# Patient Record
Sex: MALE | Race: WHITE | NOT HISPANIC OR LATINO | Employment: FULL TIME | URBAN - METROPOLITAN AREA
[De-identification: names, ages, dates, MRNs, and addresses within clinical notes are randomized per-mention and may not be internally consistent; named-entity substitution may affect disease eponyms.]

---

## 2017-01-11 ENCOUNTER — OFFICE VISIT - HEALTHEAST (OUTPATIENT)
Dept: FAMILY MEDICINE | Facility: CLINIC | Age: 37
End: 2017-01-11

## 2017-01-11 DIAGNOSIS — J03.90 EXUDATIVE TONSILLITIS: ICD-10-CM

## 2017-01-11 ASSESSMENT — MIFFLIN-ST. JEOR: SCORE: 1858.24

## 2017-08-28 ENCOUNTER — COMMUNICATION - HEALTHEAST (OUTPATIENT)
Dept: FAMILY MEDICINE | Facility: CLINIC | Age: 37
End: 2017-08-28

## 2017-09-06 ENCOUNTER — RECORDS - HEALTHEAST (OUTPATIENT)
Dept: ADMINISTRATIVE | Facility: OTHER | Age: 37
End: 2017-09-06

## 2017-09-08 ENCOUNTER — COMMUNICATION - HEALTHEAST (OUTPATIENT)
Dept: FAMILY MEDICINE | Facility: CLINIC | Age: 37
End: 2017-09-08

## 2019-03-11 ENCOUNTER — OFFICE VISIT - HEALTHEAST (OUTPATIENT)
Dept: FAMILY MEDICINE | Facility: CLINIC | Age: 39
End: 2019-03-11

## 2019-03-11 DIAGNOSIS — Z23 NEED FOR VACCINATION: ICD-10-CM

## 2019-03-11 DIAGNOSIS — F41.9 ANXIETY: ICD-10-CM

## 2019-03-11 DIAGNOSIS — L43.9 LICHEN PLANUS: ICD-10-CM

## 2019-03-11 ASSESSMENT — MIFFLIN-ST. JEOR: SCORE: 1933.58

## 2019-03-12 ENCOUNTER — COMMUNICATION - HEALTHEAST (OUTPATIENT)
Dept: FAMILY MEDICINE | Facility: CLINIC | Age: 39
End: 2019-03-12

## 2019-03-12 DIAGNOSIS — L43.9 LICHEN PLANUS: ICD-10-CM

## 2019-04-10 ENCOUNTER — OFFICE VISIT - HEALTHEAST (OUTPATIENT)
Dept: FAMILY MEDICINE | Facility: CLINIC | Age: 39
End: 2019-04-10

## 2019-04-10 DIAGNOSIS — R53.83 FATIGUE, UNSPECIFIED TYPE: ICD-10-CM

## 2019-04-10 DIAGNOSIS — L43.9 LICHEN PLANUS: ICD-10-CM

## 2019-04-10 DIAGNOSIS — F41.9 ANXIETY: ICD-10-CM

## 2019-04-10 ASSESSMENT — MIFFLIN-ST. JEOR: SCORE: 1910.62

## 2019-04-11 LAB — TSH SERPL DL<=0.005 MIU/L-ACNC: 1.57 UIU/ML (ref 0.3–5)

## 2019-04-12 LAB
HAV IGM SERPL QL IA: NEGATIVE
HBV CORE IGM SERPL QL IA: NEGATIVE
HBV SURFACE AG SERPL QL IA: NEGATIVE
HCV AB SERPL QL IA: NEGATIVE

## 2020-08-18 ENCOUNTER — OFFICE VISIT - HEALTHEAST (OUTPATIENT)
Dept: FAMILY MEDICINE | Facility: CLINIC | Age: 40
End: 2020-08-18

## 2020-08-18 DIAGNOSIS — L43.9 LICHEN PLANUS: ICD-10-CM

## 2020-08-18 DIAGNOSIS — F41.9 ANXIETY: ICD-10-CM

## 2020-08-18 RX ORDER — CLOBETASOL PROPIONATE 0.5 MG/G
CREAM TOPICAL
Qty: 45 G | Refills: 1 | Status: SHIPPED | OUTPATIENT
Start: 2020-08-18 | End: 2022-06-11

## 2020-08-18 RX ORDER — SERTRALINE HYDROCHLORIDE 100 MG/1
100 TABLET, FILM COATED ORAL DAILY
Qty: 90 TABLET | Refills: 3 | Status: SHIPPED | OUTPATIENT
Start: 2020-08-18 | End: 2022-06-11

## 2020-08-18 ASSESSMENT — PATIENT HEALTH QUESTIONNAIRE - PHQ9: SUM OF ALL RESPONSES TO PHQ QUESTIONS 1-9: 0

## 2021-05-27 ASSESSMENT — PATIENT HEALTH QUESTIONNAIRE - PHQ9: SUM OF ALL RESPONSES TO PHQ QUESTIONS 1-9: 0

## 2021-05-27 NOTE — PROGRESS NOTES
Assessment / Impression     1. Anxiety  sertraline (ZOLOFT) 100 MG tablet   2. Fatigue, unspecified type  Thyroid Cascade   3. Lichen planus  Hepatitis Acute Evaluation         Plan:     Recommended increasing the sertraline to 100 mg daily.  We are going to check a thyroid cascade as well as an acute hepatitis panel.  He is planning to bring in the lab results that he had done through work which included a hemogram and metabolic panel as well as several others according to his report.  I recommended he follow-up in 3 months or sooner if needed.    Subjective:      HPI: Chad M Johanson is a 38 y.o. male who presents to the clinic for follow-up.  Last month he was started on sertraline 50 mg daily to help with anxiety symptoms.  He reports feeling better on this medication.  His anxiety symptoms proved fairly quickly after starting it.  He has noticed over the past 1-2 weeks that the benefit has plateaued.  He continues to struggle with chronic fatigue symptoms.  This is been an issue over the past year.  He had several labs checked through work recently which she states all of which were normal.  These labs included a hemogram and metabolic panel.  In  he had a normal TSH and his hemoglobin A1c was 5%.  He also has a history of lichen planus and recently establish care with a new dermatologist.  He reports being prescribed topical steroids and they are planning a biopsy.  They also suggested he have hepatitis screening labs checked for this.  I do not have the records yet to review.        Review of Systems  All other systems reviewed and are negative.     Social History     Tobacco Use   Smoking Status Former Smoker     Last attempt to quit: 2013     Years since quittin.2   Smokeless Tobacco Never Used       Family History   Problem Relation Age of Onset     Diabetes Father      Stroke Paternal Uncle      Stroke Paternal Grandfather      Anxiety disorder Mother        Objective:     /72 (Patient  "Site: Right Arm, Patient Position: Sitting, Cuff Size: Adult Large)   Pulse 60   Temp 98.1  F (36.7  C) (Oral)   Resp 16   Ht 6' 2.3\" (1.887 m)   Wt 206 lb 6 oz (93.6 kg)   BMI 26.28 kg/m    Physical Examination: General appearance - alert, well appearing, and in no distress  Eyes: pupils equal and reactive, extraocular eye movements intact  Mouth: mucous membranes moist, pharynx normal without lesions  Neck: supple, no significant adenopathy  Lungs: clear to auscultation, no wheezes, rales or rhonchi, symmetric air entry  Heart: normal rate, regular rhythm, normal S1, S2, no murmurs, rubs, clicks or gallops  Neurological: alert, oriented, normal speech, no focal findings or movement disorder noted.    Extremities: No edema, no clubbing or cyanosis  Psychiatric: Normal affect. Does not appear anxious or depressed.    No results found for this or any previous visit (from the past 168 hour(s)).    Current Outpatient Medications   Medication Sig     [START ON 5/1/2019] sertraline (ZOLOFT) 100 MG tablet Take 1 tablet (100 mg total) by mouth daily.     "

## 2021-05-30 VITALS — HEIGHT: 75 IN | WEIGHT: 194.13 LBS | BODY MASS INDEX: 24.14 KG/M2

## 2021-06-02 VITALS — HEIGHT: 74 IN | BODY MASS INDEX: 26.49 KG/M2 | WEIGHT: 206.38 LBS

## 2021-06-02 VITALS — BODY MASS INDEX: 27.14 KG/M2 | WEIGHT: 211.44 LBS | HEIGHT: 74 IN

## 2021-06-10 NOTE — PROGRESS NOTES
"Chad M Johanson is a 39 y.o. male who is being evaluated via a billable video visit.      The patient has been notified of following:     \"This video visit will be conducted via a call between you and your physician/provider. We have found that certain health care needs can be provided without the need for an in-person physical exam.  This service lets us provide the care you need with a video conversation.  If a prescription is necessary we can send it directly to your pharmacy.  If lab work is needed we can place an order for that and you can then stop by our lab to have the test done at a later time.    Video visits are billed at different rates depending on your insurance coverage. Please reach out to your insurance provider with any questions.    If during the course of the call the physician/provider feels a video visit is not appropriate, you will not be charged for this service.\"    Patient has given verbal consent to a Video visit? Yes  How would you like to obtain your AVS? AVS Preference: MyChart.  If dropped by the video visit, the video invitation should be sent to: Text to cell phone: [phone]  Will anyone else be joining your video visit? No        Video Start Time: 8: 48    Additional provider notes:  Assessment / Impression     1. Anxiety  sertraline (ZOLOFT) 100 MG tablet   2. Lichen planus  clobetasoL (TEMOVATE) 0.05 % cream         Plan:     He was given a refill of sertraline which has worked well for anxiety symptoms in the past.  I recommended he start 50 mg daily for the first 2-3 weeks, then increase to 100 mg daily.  He was also given a prescription for clobetasol which has worked well for him in the past and treating the lichen planus symptoms which primarily affects his shins and hands.  I offered a referral for him to go back and see dermatology, but he declined this for now.    Subjective:      HPI: Chad M Johanson is a 39 y.o. male who is scheduled for a virtual appointment for " follow-up.  He has a history of anxiety and has been on sertraline up to 100 mg daily in the past.  He reports that he has been off his medication for about a year and would like to restart.  He actually reports taking half of his 100 mg tablets recently over the past few days.  He feels like the 100 mg total was better for him than 50 mg daily.  Since being off the medication he reports having increased anxiety symptoms and feeling more on edge.  He also has a history of lichen planus which primarily affects his shins and the backs of his hands.  He has seen dermatology in the past and has been on metronidazole, but he did not feel like this worked as well as clobetasol.  He is wondering if he can get a prescription for the steroid cream today.        Review of Systems  All other systems reviewed and are negative.     Social History     Tobacco Use   Smoking Status Former Smoker     Last attempt to quit: 2013     Years since quittin.6   Smokeless Tobacco Never Used       Family History   Problem Relation Age of Onset     Diabetes Father      Stroke Paternal Uncle      Stroke Paternal Grandfather      Anxiety disorder Mother        Objective:     There were no vitals taken for this visit.  Physical Examination: General appearance - alert, well appearing, and in no distress  Eyes: Eyes appear grossly normal.  Conjunctiva clear.  Extraocular eye movements intact  Mouth: mucous membranes moist  Lungs: Breathing is not labored.  No audible wheezes, rales or rhonchi  Neurological: alert, oriented, normal speech, no focal findings or movement disorder noted.    Psychiatric: Normal affect. Does not appear anxious or depressed.  Insight and judgment intact    No results found for this or any previous visit (from the past 168 hour(s)).    Current Outpatient Medications   Medication Sig     sertraline (ZOLOFT) 100 MG tablet Take 1 tablet (100 mg total) by mouth daily.     clobetasoL (TEMOVATE) 0.05 % cream Apply to  affected area twice daily for 2-week intervals as needed.         Video-Visit Details    Type of service:  Video Visit    Video End Time (time video stopped): 8: 57  Originating Location (pt. Location): Work    Distant Location (provider location):  [facility]     Platform used for Video Visit: Joyce Brice DO

## 2021-06-24 NOTE — PROGRESS NOTES
"Assessment / Impression     1. Anxiety     2. Lichen planus     3. Need for vaccination  Influenza, Seasonal Quad, Preservative Free 36+ Months         Plan:     We discussed treatment options for his worsening anxiety symptoms and decided to start sertraline 50 mg daily.  He is going to take a half dose for the first week.  We discussed strategies to help reduce stress.  I encouraged him to get regular exercise and adequate sleep and to eat a healthy diet.  He may benefit from counseling as well.  He reports having a health dynamics physical through work just a couple weeks ago and all of the blood tests were normal.  He is planning to bring these results to his follow-up appointment in 1 month.  I provided a prescription for topical metronidazole 0.75% that he may apply to the areas of lichen planus twice daily.    Subjective:      HPI: Chad M Johanson is a 38 y.o. male who presents to the clinic to discuss a couple of concerns.  He describes having worsening anxiety symptoms over the past several months.  He feels like he has struggled with anxiety his whole life, but things have been getting worse.  He describes himself as \"wound tight\" and frequently on edge and irritable.  He describes having quite a bit of stress at work which is contributing to this.  He is an .  He is  and has a 3-year-old daughter and 1-year-old son.  His wife is been going back to school which has also been somewhat stressful for the family.  He is interested in starting a medication for this and feels like he should have done this a long time ago.  He denies feeling depressed or suicidal.    He has a history of lichen planus.  He states that this was diagnosed to a dermatologist.  He is to take metronidazole, but he ran out.  He is developing symptoms again on his hands, forearms and legs.        Review of Systems  All other systems reviewed and are negative.     Social History     Tobacco Use   Smoking Status Former " "Smoker     Last attempt to quit: 2013     Years since quittin.1   Smokeless Tobacco Never Used       Family History   Problem Relation Age of Onset     Diabetes Father      Stroke Paternal Uncle      Stroke Paternal Grandfather      Anxiety disorder Mother        Objective:     /58 (Patient Site: Right Arm, Patient Position: Sitting, Cuff Size: Adult Large)   Pulse 64   Temp 97.8  F (36.6  C) (Oral)   Resp 16   Ht 6' 2.3\" (1.887 m)   Wt 211 lb 7 oz (95.9 kg)   BMI 26.93 kg/m    Physical Examination: General appearance - alert, well appearing, and in no distress  Eyes: pupils equal and reactive, extraocular eye movements intact  Mouth: mucous membranes moist, pharynx normal without lesions  Neck: supple, no significant adenopathy  Lungs: clear to auscultation, no wheezes, rales or rhonchi, symmetric air entry  Heart: normal rate, regular rhythm, normal S1, S2, no murmurs, rubs, clicks or gallops  Neurological: alert, oriented, normal speech, no focal findings or movement disorder noted.    Extremities: No edema, no clubbing or cyanosis  Psychiatric: Appears moderately anxious.  Answers questions appropriately.  Does not appear depressed.  PHQ-9 score is 8.  OTIS-7 score is 18.  Skin: There are several erythematous, circular patches that are slightly raised on his forearms, hands and shins.    No results found for this or any previous visit (from the past 168 hour(s)).    Current Outpatient Medications   Medication Sig     metroNIDAZOLE (METROCREAM) 0.75 % cream Apply to affected area twice daily.     sertraline (ZOLOFT) 50 MG tablet Take 1 tablet (50 mg total) by mouth daily.     "

## 2021-08-21 ENCOUNTER — HEALTH MAINTENANCE LETTER (OUTPATIENT)
Age: 41
End: 2021-08-21

## 2021-10-16 ENCOUNTER — HEALTH MAINTENANCE LETTER (OUTPATIENT)
Age: 41
End: 2021-10-16

## 2022-01-03 ENCOUNTER — TRANSFERRED RECORDS (OUTPATIENT)
Dept: HEALTH INFORMATION MANAGEMENT | Facility: CLINIC | Age: 42
End: 2022-01-03
Payer: COMMERCIAL

## 2022-06-11 ENCOUNTER — HOSPITAL ENCOUNTER (INPATIENT)
Facility: HOSPITAL | Age: 42
LOS: 2 days | Discharge: HOME | DRG: 343 | End: 2022-06-14
Attending: EMERGENCY MEDICINE | Admitting: SPECIALIST
Payer: COMMERCIAL

## 2022-06-11 ENCOUNTER — APPOINTMENT (OUTPATIENT)
Dept: CT IMAGING | Facility: HOSPITAL | Age: 42
DRG: 343 | End: 2022-06-11
Attending: EMERGENCY MEDICINE
Payer: COMMERCIAL

## 2022-06-11 DIAGNOSIS — K35.30 ACUTE APPENDICITIS WITH LOCALIZED PERITONITIS, WITHOUT PERFORATION, ABSCESS, OR GANGRENE: ICD-10-CM

## 2022-06-11 LAB
ALBUMIN SERPL-MCNC: 4.2 G/DL (ref 3.5–5)
ALP SERPL-CCNC: 98 U/L (ref 45–120)
ALT SERPL W P-5'-P-CCNC: 24 U/L (ref 0–45)
ANION GAP SERPL CALCULATED.3IONS-SCNC: 11 MMOL/L (ref 5–18)
AST SERPL W P-5'-P-CCNC: 30 U/L (ref 0–40)
BASOPHILS # BLD AUTO: 0.1 10E3/UL (ref 0–0.2)
BASOPHILS NFR BLD AUTO: 0 %
BILIRUB DIRECT SERPL-MCNC: 0.2 MG/DL
BILIRUB SERPL-MCNC: 0.8 MG/DL (ref 0–1)
BUN SERPL-MCNC: 19 MG/DL (ref 8–22)
CALCIUM SERPL-MCNC: 9.3 MG/DL (ref 8.5–10.5)
CHLORIDE BLD-SCNC: 106 MMOL/L (ref 98–107)
CO2 SERPL-SCNC: 21 MMOL/L (ref 22–31)
CREAT SERPL-MCNC: 0.85 MG/DL (ref 0.7–1.3)
EOSINOPHIL # BLD AUTO: 0 10E3/UL (ref 0–0.7)
EOSINOPHIL NFR BLD AUTO: 0 %
ERYTHROCYTE [DISTWIDTH] IN BLOOD BY AUTOMATED COUNT: 13.3 % (ref 10–15)
GFR SERPL CREATININE-BSD FRML MDRD: >90 ML/MIN/1.73M2
GLUCOSE BLD-MCNC: 121 MG/DL (ref 70–125)
HCT VFR BLD AUTO: 42.9 % (ref 40–53)
HGB BLD-MCNC: 15.2 G/DL (ref 13.3–17.7)
IMM GRANULOCYTES # BLD: 0.1 10E3/UL
IMM GRANULOCYTES NFR BLD: 0 %
LYMPHOCYTES # BLD AUTO: 1.6 10E3/UL (ref 0.8–5.3)
LYMPHOCYTES NFR BLD AUTO: 10 %
MCH RBC QN AUTO: 30.7 PG (ref 26.5–33)
MCHC RBC AUTO-ENTMCNC: 35.4 G/DL (ref 31.5–36.5)
MCV RBC AUTO: 87 FL (ref 78–100)
MONOCYTES # BLD AUTO: 0.7 10E3/UL (ref 0–1.3)
MONOCYTES NFR BLD AUTO: 5 %
NEUTROPHILS # BLD AUTO: 13.4 10E3/UL (ref 1.6–8.3)
NEUTROPHILS NFR BLD AUTO: 85 %
NRBC # BLD AUTO: 0 10E3/UL
NRBC BLD AUTO-RTO: 0 /100
PLATELET # BLD AUTO: 234 10E3/UL (ref 150–450)
POTASSIUM BLD-SCNC: 4.1 MMOL/L (ref 3.5–5)
PROT SERPL-MCNC: 7.2 G/DL (ref 6–8)
RBC # BLD AUTO: 4.95 10E6/UL (ref 4.4–5.9)
SARS-COV-2 RNA RESP QL NAA+PROBE: NEGATIVE
SODIUM SERPL-SCNC: 138 MMOL/L (ref 136–145)
WBC # BLD AUTO: 15.7 10E3/UL (ref 4–11)

## 2022-06-11 PROCEDURE — G0378 HOSPITAL OBSERVATION PER HR: HCPCS

## 2022-06-11 PROCEDURE — 36415 COLL VENOUS BLD VENIPUNCTURE: CPT | Performed by: STUDENT IN AN ORGANIZED HEALTH CARE EDUCATION/TRAINING PROGRAM

## 2022-06-11 PROCEDURE — 82248 BILIRUBIN DIRECT: CPT | Performed by: STUDENT IN AN ORGANIZED HEALTH CARE EDUCATION/TRAINING PROGRAM

## 2022-06-11 PROCEDURE — 87635 SARS-COV-2 COVID-19 AMP PRB: CPT | Performed by: EMERGENCY MEDICINE

## 2022-06-11 PROCEDURE — 99285 EMERGENCY DEPT VISIT HI MDM: CPT | Mod: 25

## 2022-06-11 PROCEDURE — 85004 AUTOMATED DIFF WBC COUNT: CPT | Performed by: STUDENT IN AN ORGANIZED HEALTH CARE EDUCATION/TRAINING PROGRAM

## 2022-06-11 PROCEDURE — C9803 HOPD COVID-19 SPEC COLLECT: HCPCS

## 2022-06-11 PROCEDURE — 96375 TX/PRO/DX INJ NEW DRUG ADDON: CPT

## 2022-06-11 PROCEDURE — 82248 BILIRUBIN DIRECT: CPT | Performed by: EMERGENCY MEDICINE

## 2022-06-11 PROCEDURE — 85025 COMPLETE CBC W/AUTO DIFF WBC: CPT | Performed by: EMERGENCY MEDICINE

## 2022-06-11 PROCEDURE — 74177 CT ABD & PELVIS W/CONTRAST: CPT

## 2022-06-11 PROCEDURE — 250N000011 HC RX IP 250 OP 636: Performed by: EMERGENCY MEDICINE

## 2022-06-11 PROCEDURE — 96374 THER/PROPH/DIAG INJ IV PUSH: CPT | Mod: 59

## 2022-06-11 RX ORDER — PIPERACILLIN SODIUM, TAZOBACTAM SODIUM 3; .375 G/15ML; G/15ML
3.38 INJECTION, POWDER, LYOPHILIZED, FOR SOLUTION INTRAVENOUS ONCE
Status: COMPLETED | OUTPATIENT
Start: 2022-06-11 | End: 2022-06-11

## 2022-06-11 RX ORDER — IOPAMIDOL 755 MG/ML
100 INJECTION, SOLUTION INTRAVASCULAR ONCE
Status: COMPLETED | OUTPATIENT
Start: 2022-06-11 | End: 2022-06-11

## 2022-06-11 RX ORDER — MORPHINE SULFATE 4 MG/ML
4 INJECTION, SOLUTION INTRAMUSCULAR; INTRAVENOUS ONCE
Status: COMPLETED | OUTPATIENT
Start: 2022-06-11 | End: 2022-06-11

## 2022-06-11 RX ADMIN — MORPHINE SULFATE 4 MG: 4 INJECTION INTRAVENOUS at 22:29

## 2022-06-11 RX ADMIN — PIPERACILLIN AND TAZOBACTAM 3.38 G: 3; .375 INJECTION, POWDER, FOR SOLUTION INTRAVENOUS at 22:30

## 2022-06-11 RX ADMIN — IOPAMIDOL 100 ML: 755 INJECTION, SOLUTION INTRAVENOUS at 21:03

## 2022-06-11 NOTE — ED TRIAGE NOTES
patient here for right sided abdominal pain, patient reports this began this morning. Patient reports that he is nauseated. Patient states he has his appendix. Patient states no medical problems and that pain is worsening since this morning.

## 2022-06-12 ENCOUNTER — ANESTHESIA EVENT (OUTPATIENT)
Dept: SURGERY | Facility: HOSPITAL | Age: 42
DRG: 343 | End: 2022-06-12
Payer: COMMERCIAL

## 2022-06-12 ENCOUNTER — ANESTHESIA (OUTPATIENT)
Dept: SURGERY | Facility: HOSPITAL | Age: 42
DRG: 343 | End: 2022-06-12
Payer: COMMERCIAL

## 2022-06-12 LAB — PLATELET # BLD AUTO: 222 10E3/UL (ref 150–450)

## 2022-06-12 PROCEDURE — 120N000001 HC R&B MED SURG/OB

## 2022-06-12 PROCEDURE — 360N000076 HC SURGERY LEVEL 3, PER MIN: Performed by: SPECIALIST

## 2022-06-12 PROCEDURE — 250N000011 HC RX IP 250 OP 636: Performed by: EMERGENCY MEDICINE

## 2022-06-12 PROCEDURE — 258N000003 HC RX IP 258 OP 636: Performed by: ANESTHESIOLOGY

## 2022-06-12 PROCEDURE — 44970 LAPAROSCOPY APPENDECTOMY: CPT | Performed by: SPECIALIST

## 2022-06-12 PROCEDURE — 710N000009 HC RECOVERY PHASE 1, LEVEL 1, PER MIN: Performed by: SPECIALIST

## 2022-06-12 PROCEDURE — 250N000011 HC RX IP 250 OP 636: Performed by: ANESTHESIOLOGY

## 2022-06-12 PROCEDURE — 250N000009 HC RX 250: Performed by: NURSE ANESTHETIST, CERTIFIED REGISTERED

## 2022-06-12 PROCEDURE — 258N000003 HC RX IP 258 OP 636: Performed by: EMERGENCY MEDICINE

## 2022-06-12 PROCEDURE — G0378 HOSPITAL OBSERVATION PER HR: HCPCS

## 2022-06-12 PROCEDURE — 250N000013 HC RX MED GY IP 250 OP 250 PS 637: Performed by: SPECIALIST

## 2022-06-12 PROCEDURE — 96376 TX/PRO/DX INJ SAME DRUG ADON: CPT

## 2022-06-12 PROCEDURE — 88304 TISSUE EXAM BY PATHOLOGIST: CPT | Mod: TC | Performed by: SPECIALIST

## 2022-06-12 PROCEDURE — 272N000001 HC OR GENERAL SUPPLY STERILE: Performed by: SPECIALIST

## 2022-06-12 PROCEDURE — 85049 AUTOMATED PLATELET COUNT: CPT | Performed by: SPECIALIST

## 2022-06-12 PROCEDURE — 999N000141 HC STATISTIC PRE-PROCEDURE NURSING ASSESSMENT: Performed by: SPECIALIST

## 2022-06-12 PROCEDURE — 0DTJ4ZZ RESECTION OF APPENDIX, PERCUTANEOUS ENDOSCOPIC APPROACH: ICD-10-PCS | Performed by: SPECIALIST

## 2022-06-12 PROCEDURE — 36415 COLL VENOUS BLD VENIPUNCTURE: CPT | Performed by: SPECIALIST

## 2022-06-12 PROCEDURE — 250N000011 HC RX IP 250 OP 636: Performed by: SPECIALIST

## 2022-06-12 PROCEDURE — 99222 1ST HOSP IP/OBS MODERATE 55: CPT | Mod: 57 | Performed by: SPECIALIST

## 2022-06-12 PROCEDURE — 370N000017 HC ANESTHESIA TECHNICAL FEE, PER MIN: Performed by: SPECIALIST

## 2022-06-12 PROCEDURE — 250N000025 HC SEVOFLURANE, PER MIN: Performed by: SPECIALIST

## 2022-06-12 PROCEDURE — 250N000011 HC RX IP 250 OP 636: Performed by: NURSE ANESTHETIST, CERTIFIED REGISTERED

## 2022-06-12 RX ORDER — ONDANSETRON 4 MG/1
4 TABLET, ORALLY DISINTEGRATING ORAL EVERY 30 MIN PRN
Status: DISCONTINUED | OUTPATIENT
Start: 2022-06-12 | End: 2022-06-12 | Stop reason: HOSPADM

## 2022-06-12 RX ORDER — LIDOCAINE 40 MG/G
CREAM TOPICAL
Status: DISCONTINUED | OUTPATIENT
Start: 2022-06-12 | End: 2022-06-14 | Stop reason: HOSPADM

## 2022-06-12 RX ORDER — LIDOCAINE 40 MG/G
CREAM TOPICAL
Status: DISCONTINUED | OUTPATIENT
Start: 2022-06-12 | End: 2022-06-12 | Stop reason: HOSPADM

## 2022-06-12 RX ORDER — SODIUM CHLORIDE, SODIUM LACTATE, POTASSIUM CHLORIDE, CALCIUM CHLORIDE 600; 310; 30; 20 MG/100ML; MG/100ML; MG/100ML; MG/100ML
INJECTION, SOLUTION INTRAVENOUS CONTINUOUS
Status: DISCONTINUED | OUTPATIENT
Start: 2022-06-12 | End: 2022-06-12 | Stop reason: HOSPADM

## 2022-06-12 RX ORDER — PROCHLORPERAZINE MALEATE 10 MG
10 TABLET ORAL EVERY 6 HOURS PRN
Status: DISCONTINUED | OUTPATIENT
Start: 2022-06-12 | End: 2022-06-14 | Stop reason: HOSPADM

## 2022-06-12 RX ORDER — OXYCODONE HYDROCHLORIDE 5 MG/1
5 TABLET ORAL EVERY 4 HOURS PRN
Status: DISCONTINUED | OUTPATIENT
Start: 2022-06-12 | End: 2022-06-14 | Stop reason: HOSPADM

## 2022-06-12 RX ORDER — OXYCODONE HYDROCHLORIDE 5 MG/1
5-10 TABLET ORAL EVERY 4 HOURS PRN
Status: DISCONTINUED | OUTPATIENT
Start: 2022-06-12 | End: 2022-06-12 | Stop reason: HOSPADM

## 2022-06-12 RX ORDER — NALOXONE HYDROCHLORIDE 0.4 MG/ML
0.2 INJECTION, SOLUTION INTRAMUSCULAR; INTRAVENOUS; SUBCUTANEOUS
Status: DISCONTINUED | OUTPATIENT
Start: 2022-06-12 | End: 2022-06-14 | Stop reason: HOSPADM

## 2022-06-12 RX ORDER — NALOXONE HYDROCHLORIDE 0.4 MG/ML
0.4 INJECTION, SOLUTION INTRAMUSCULAR; INTRAVENOUS; SUBCUTANEOUS
Status: DISCONTINUED | OUTPATIENT
Start: 2022-06-12 | End: 2022-06-14 | Stop reason: HOSPADM

## 2022-06-12 RX ORDER — MORPHINE SULFATE 4 MG/ML
4 INJECTION, SOLUTION INTRAMUSCULAR; INTRAVENOUS ONCE
Status: COMPLETED | OUTPATIENT
Start: 2022-06-12 | End: 2022-06-12

## 2022-06-12 RX ORDER — FENTANYL CITRATE 50 UG/ML
50 INJECTION, SOLUTION INTRAMUSCULAR; INTRAVENOUS
Status: DISCONTINUED | OUTPATIENT
Start: 2022-06-12 | End: 2022-06-12 | Stop reason: HOSPADM

## 2022-06-12 RX ORDER — FENTANYL CITRATE 50 UG/ML
INJECTION, SOLUTION INTRAMUSCULAR; INTRAVENOUS PRN
Status: DISCONTINUED | OUTPATIENT
Start: 2022-06-12 | End: 2022-06-12

## 2022-06-12 RX ORDER — POLYETHYLENE GLYCOL 3350 17 G/17G
17 POWDER, FOR SOLUTION ORAL DAILY
Status: DISCONTINUED | OUTPATIENT
Start: 2022-06-13 | End: 2022-06-14 | Stop reason: HOSPADM

## 2022-06-12 RX ORDER — MEPERIDINE HYDROCHLORIDE 25 MG/ML
12.5 INJECTION INTRAMUSCULAR; INTRAVENOUS; SUBCUTANEOUS
Status: DISCONTINUED | OUTPATIENT
Start: 2022-06-12 | End: 2022-06-12 | Stop reason: HOSPADM

## 2022-06-12 RX ORDER — BISACODYL 10 MG
10 SUPPOSITORY, RECTAL RECTAL DAILY PRN
Status: DISCONTINUED | OUTPATIENT
Start: 2022-06-12 | End: 2022-06-14 | Stop reason: HOSPADM

## 2022-06-12 RX ORDER — ONDANSETRON 2 MG/ML
4 INJECTION INTRAMUSCULAR; INTRAVENOUS EVERY 6 HOURS PRN
Status: DISCONTINUED | OUTPATIENT
Start: 2022-06-12 | End: 2022-06-14 | Stop reason: HOSPADM

## 2022-06-12 RX ORDER — CALCIUM CARBONATE 500 MG/1
500 TABLET, CHEWABLE ORAL 4 TIMES DAILY PRN
Status: DISCONTINUED | OUTPATIENT
Start: 2022-06-12 | End: 2022-06-14 | Stop reason: HOSPADM

## 2022-06-12 RX ORDER — HEPARIN SODIUM 5000 [USP'U]/.5ML
5000 INJECTION, SOLUTION INTRAVENOUS; SUBCUTANEOUS EVERY 8 HOURS
Status: DISCONTINUED | OUTPATIENT
Start: 2022-06-13 | End: 2022-06-14 | Stop reason: HOSPADM

## 2022-06-12 RX ORDER — LIDOCAINE HYDROCHLORIDE 10 MG/ML
INJECTION, SOLUTION INFILTRATION; PERINEURAL PRN
Status: DISCONTINUED | OUTPATIENT
Start: 2022-06-12 | End: 2022-06-12

## 2022-06-12 RX ORDER — AMOXICILLIN 250 MG
1 CAPSULE ORAL 2 TIMES DAILY
Status: DISCONTINUED | OUTPATIENT
Start: 2022-06-12 | End: 2022-06-14 | Stop reason: HOSPADM

## 2022-06-12 RX ORDER — ACETAMINOPHEN 325 MG/1
975 TABLET ORAL EVERY 8 HOURS
Status: DISCONTINUED | OUTPATIENT
Start: 2022-06-12 | End: 2022-06-14 | Stop reason: HOSPADM

## 2022-06-12 RX ORDER — SODIUM CHLORIDE, SODIUM LACTATE, POTASSIUM CHLORIDE, CALCIUM CHLORIDE 600; 310; 30; 20 MG/100ML; MG/100ML; MG/100ML; MG/100ML
INJECTION, SOLUTION INTRAVENOUS CONTINUOUS
Status: DISCONTINUED | OUTPATIENT
Start: 2022-06-12 | End: 2022-06-13 | Stop reason: CLARIF

## 2022-06-12 RX ORDER — LABETALOL HYDROCHLORIDE 5 MG/ML
10 INJECTION, SOLUTION INTRAVENOUS
Status: DISCONTINUED | OUTPATIENT
Start: 2022-06-12 | End: 2022-06-12 | Stop reason: HOSPADM

## 2022-06-12 RX ORDER — HYDROMORPHONE HCL IN WATER/PF 6 MG/30 ML
0.2 PATIENT CONTROLLED ANALGESIA SYRINGE INTRAVENOUS
Status: DISCONTINUED | OUTPATIENT
Start: 2022-06-12 | End: 2022-06-14 | Stop reason: HOSPADM

## 2022-06-12 RX ORDER — OXYCODONE HYDROCHLORIDE 5 MG/1
10 TABLET ORAL EVERY 4 HOURS PRN
Status: DISCONTINUED | OUTPATIENT
Start: 2022-06-12 | End: 2022-06-14 | Stop reason: HOSPADM

## 2022-06-12 RX ORDER — ONDANSETRON 4 MG/1
4 TABLET, ORALLY DISINTEGRATING ORAL EVERY 6 HOURS PRN
Status: DISCONTINUED | OUTPATIENT
Start: 2022-06-12 | End: 2022-06-14 | Stop reason: HOSPADM

## 2022-06-12 RX ORDER — GLYCOPYRROLATE 0.2 MG/ML
INJECTION, SOLUTION INTRAMUSCULAR; INTRAVENOUS PRN
Status: DISCONTINUED | OUTPATIENT
Start: 2022-06-12 | End: 2022-06-12

## 2022-06-12 RX ORDER — PROPOFOL 10 MG/ML
INJECTION, EMULSION INTRAVENOUS PRN
Status: DISCONTINUED | OUTPATIENT
Start: 2022-06-12 | End: 2022-06-12

## 2022-06-12 RX ORDER — BUPIVACAINE HYDROCHLORIDE 2.5 MG/ML
INJECTION, SOLUTION INFILTRATION; PERINEURAL PRN
Status: DISCONTINUED | OUTPATIENT
Start: 2022-06-12 | End: 2022-06-12 | Stop reason: HOSPADM

## 2022-06-12 RX ORDER — DEXAMETHASONE SODIUM PHOSPHATE 10 MG/ML
INJECTION, SOLUTION INTRAMUSCULAR; INTRAVENOUS PRN
Status: DISCONTINUED | OUTPATIENT
Start: 2022-06-12 | End: 2022-06-12

## 2022-06-12 RX ORDER — PIPERACILLIN SODIUM, TAZOBACTAM SODIUM 3; .375 G/15ML; G/15ML
3.38 INJECTION, POWDER, LYOPHILIZED, FOR SOLUTION INTRAVENOUS EVERY 8 HOURS
Status: DISCONTINUED | OUTPATIENT
Start: 2022-06-12 | End: 2022-06-14 | Stop reason: HOSPADM

## 2022-06-12 RX ORDER — ONDANSETRON 2 MG/ML
4 INJECTION INTRAMUSCULAR; INTRAVENOUS EVERY 30 MIN PRN
Status: DISCONTINUED | OUTPATIENT
Start: 2022-06-12 | End: 2022-06-12 | Stop reason: HOSPADM

## 2022-06-12 RX ORDER — SODIUM CHLORIDE 9 MG/ML
INJECTION, SOLUTION INTRAVENOUS ONCE
Status: DISCONTINUED | OUTPATIENT
Start: 2022-06-12 | End: 2022-06-14 | Stop reason: HOSPADM

## 2022-06-12 RX ORDER — DIPHENHYDRAMINE HCL 25 MG
25 CAPSULE ORAL EVERY 6 HOURS PRN
Status: DISCONTINUED | OUTPATIENT
Start: 2022-06-12 | End: 2022-06-14 | Stop reason: HOSPADM

## 2022-06-12 RX ORDER — PIPERACILLIN SODIUM, TAZOBACTAM SODIUM 3; .375 G/15ML; G/15ML
3.38 INJECTION, POWDER, LYOPHILIZED, FOR SOLUTION INTRAVENOUS EVERY 8 HOURS
Status: DISCONTINUED | OUTPATIENT
Start: 2022-06-12 | End: 2022-06-12

## 2022-06-12 RX ORDER — ACETAMINOPHEN 325 MG/1
650 TABLET ORAL EVERY 4 HOURS PRN
Status: DISCONTINUED | OUTPATIENT
Start: 2022-06-15 | End: 2022-06-14 | Stop reason: HOSPADM

## 2022-06-12 RX ORDER — HYDROMORPHONE HCL IN WATER/PF 6 MG/30 ML
0.4 PATIENT CONTROLLED ANALGESIA SYRINGE INTRAVENOUS
Status: DISCONTINUED | OUTPATIENT
Start: 2022-06-12 | End: 2022-06-14 | Stop reason: HOSPADM

## 2022-06-12 RX ORDER — FENTANYL CITRATE 50 UG/ML
50 INJECTION, SOLUTION INTRAMUSCULAR; INTRAVENOUS EVERY 5 MIN PRN
Status: DISCONTINUED | OUTPATIENT
Start: 2022-06-12 | End: 2022-06-12 | Stop reason: HOSPADM

## 2022-06-12 RX ORDER — DIPHENHYDRAMINE HYDROCHLORIDE 50 MG/ML
25 INJECTION INTRAMUSCULAR; INTRAVENOUS EVERY 6 HOURS PRN
Status: DISCONTINUED | OUTPATIENT
Start: 2022-06-12 | End: 2022-06-14 | Stop reason: HOSPADM

## 2022-06-12 RX ORDER — KETOROLAC TROMETHAMINE 15 MG/ML
15 INJECTION, SOLUTION INTRAMUSCULAR; INTRAVENOUS EVERY 6 HOURS
Status: COMPLETED | OUTPATIENT
Start: 2022-06-12 | End: 2022-06-13

## 2022-06-12 RX ORDER — ONDANSETRON 2 MG/ML
INJECTION INTRAMUSCULAR; INTRAVENOUS PRN
Status: DISCONTINUED | OUTPATIENT
Start: 2022-06-12 | End: 2022-06-12

## 2022-06-12 RX ADMIN — PIPERACILLIN AND TAZOBACTAM 3.38 G: 3; .375 INJECTION, POWDER, LYOPHILIZED, FOR SOLUTION INTRAVENOUS at 17:42

## 2022-06-12 RX ADMIN — KETOROLAC TROMETHAMINE 15 MG: 15 INJECTION, SOLUTION INTRAMUSCULAR; INTRAVENOUS at 19:34

## 2022-06-12 RX ADMIN — PIPERACILLIN AND TAZOBACTAM 3.38 G: 3; .375 INJECTION, POWDER, LYOPHILIZED, FOR SOLUTION INTRAVENOUS at 09:26

## 2022-06-12 RX ADMIN — OXYCODONE HYDROCHLORIDE 10 MG: 5 TABLET ORAL at 14:49

## 2022-06-12 RX ADMIN — ROCURONIUM BROMIDE 20 MG: 50 INJECTION, SOLUTION INTRAVENOUS at 11:09

## 2022-06-12 RX ADMIN — MIDAZOLAM 2 MG: 1 INJECTION INTRAMUSCULAR; INTRAVENOUS at 10:39

## 2022-06-12 RX ADMIN — MORPHINE SULFATE 4 MG: 4 INJECTION, SOLUTION INTRAMUSCULAR; INTRAVENOUS at 04:27

## 2022-06-12 RX ADMIN — KETOROLAC TROMETHAMINE 15 MG: 15 INJECTION, SOLUTION INTRAMUSCULAR; INTRAVENOUS at 13:53

## 2022-06-12 RX ADMIN — FENTANYL CITRATE 50 MCG: 50 INJECTION INTRAMUSCULAR; INTRAVENOUS at 12:03

## 2022-06-12 RX ADMIN — SUGAMMADEX 100 MG: 100 INJECTION, SOLUTION INTRAVENOUS at 11:49

## 2022-06-12 RX ADMIN — SODIUM CHLORIDE, POTASSIUM CHLORIDE, SODIUM LACTATE AND CALCIUM CHLORIDE: 600; 310; 30; 20 INJECTION, SOLUTION INTRAVENOUS at 10:39

## 2022-06-12 RX ADMIN — FENTANYL CITRATE 50 MCG: 50 INJECTION, SOLUTION INTRAMUSCULAR; INTRAVENOUS at 11:48

## 2022-06-12 RX ADMIN — PROPOFOL 200 MG: 10 INJECTION, EMULSION INTRAVENOUS at 10:43

## 2022-06-12 RX ADMIN — SUGAMMADEX 200 MG: 100 INJECTION, SOLUTION INTRAVENOUS at 11:42

## 2022-06-12 RX ADMIN — OXYCODONE HYDROCHLORIDE 10 MG: 5 TABLET ORAL at 23:17

## 2022-06-12 RX ADMIN — SODIUM CHLORIDE 1000 ML: 9 INJECTION, SOLUTION INTRAVENOUS at 08:33

## 2022-06-12 RX ADMIN — FENTANYL CITRATE 100 MCG: 50 INJECTION, SOLUTION INTRAMUSCULAR; INTRAVENOUS at 10:42

## 2022-06-12 RX ADMIN — ACETAMINOPHEN 975 MG: 325 TABLET ORAL at 20:42

## 2022-06-12 RX ADMIN — HYDROMORPHONE HYDROCHLORIDE 0.4 MG: 0.2 INJECTION, SOLUTION INTRAMUSCULAR; INTRAVENOUS; SUBCUTANEOUS at 20:41

## 2022-06-12 RX ADMIN — ROCURONIUM BROMIDE 40 MG: 50 INJECTION, SOLUTION INTRAVENOUS at 10:43

## 2022-06-12 RX ADMIN — ROCURONIUM BROMIDE 10 MG: 50 INJECTION, SOLUTION INTRAVENOUS at 10:58

## 2022-06-12 RX ADMIN — HYDROMORPHONE HYDROCHLORIDE 1 MG: 1 INJECTION, SOLUTION INTRAMUSCULAR; INTRAVENOUS; SUBCUTANEOUS at 11:10

## 2022-06-12 RX ADMIN — DEXAMETHASONE SODIUM PHOSPHATE 10 MG: 10 INJECTION, SOLUTION INTRAMUSCULAR; INTRAVENOUS at 10:42

## 2022-06-12 RX ADMIN — HYDROMORPHONE HYDROCHLORIDE 0.2 MG: 0.2 INJECTION, SOLUTION INTRAMUSCULAR; INTRAVENOUS; SUBCUTANEOUS at 16:57

## 2022-06-12 RX ADMIN — ACETAMINOPHEN 975 MG: 325 TABLET ORAL at 13:53

## 2022-06-12 RX ADMIN — FENTANYL CITRATE 50 MCG: 50 INJECTION INTRAMUSCULAR; INTRAVENOUS at 12:27

## 2022-06-12 RX ADMIN — MORPHINE SULFATE 4 MG: 4 INJECTION INTRAVENOUS at 08:32

## 2022-06-12 RX ADMIN — FENTANYL CITRATE 50 MCG: 50 INJECTION INTRAMUSCULAR; INTRAVENOUS at 12:38

## 2022-06-12 RX ADMIN — LIDOCAINE HYDROCHLORIDE 50 MG: 10 INJECTION, SOLUTION INFILTRATION; PERINEURAL at 10:42

## 2022-06-12 RX ADMIN — HYDROMORPHONE HYDROCHLORIDE 0.4 MG: 0.2 INJECTION, SOLUTION INTRAMUSCULAR; INTRAVENOUS; SUBCUTANEOUS at 23:36

## 2022-06-12 RX ADMIN — FENTANYL CITRATE 50 MCG: 50 INJECTION INTRAMUSCULAR; INTRAVENOUS at 12:14

## 2022-06-12 RX ADMIN — GLYCOPYRROLATE 0.2 MG: 0.2 INJECTION, SOLUTION INTRAMUSCULAR; INTRAVENOUS at 10:42

## 2022-06-12 RX ADMIN — ONDANSETRON 4 MG: 2 INJECTION INTRAMUSCULAR; INTRAVENOUS at 11:36

## 2022-06-12 RX ADMIN — SENNOSIDES AND DOCUSATE SODIUM 1 TABLET: 8.6; 5 TABLET ORAL at 20:42

## 2022-06-12 ASSESSMENT — ACTIVITIES OF DAILY LIVING (ADL)
ADLS_ACUITY_SCORE: 18

## 2022-06-12 ASSESSMENT — LIFESTYLE VARIABLES: TOBACCO_USE: 1

## 2022-06-12 NOTE — ANESTHESIA POSTPROCEDURE EVALUATION
Patient: Chad M Johanson    Procedure: Procedure(s):  APPENDECTOMY, LAPAROSCOPIC       Anesthesia Type:  General    Note:  Disposition: Inpatient   Postop Pain Control: Uneventful            Sign Out: Well controlled pain   PONV: No   Neuro/Psych: Uneventful            Sign Out: Acceptable/Baseline neuro status   Airway/Respiratory: Uneventful            Sign Out: Acceptable/Baseline resp. status   CV/Hemodynamics: Uneventful            Sign Out: Acceptable CV status; No obvious hypovolemia; No obvious fluid overload   Other NRE:    DID A NON-ROUTINE EVENT OCCUR?            Last vitals:  Vitals Value Taken Time   /72 06/12/22 1250   Temp 37.2  C (98.9  F) 06/12/22 1250   Pulse 86 06/12/22 1300   Resp 14 06/12/22 1300   SpO2 99 % 06/12/22 1300   Vitals shown include unvalidated device data.    Electronically Signed By: Angela Livingston MD  June 12, 2022  1:50 PM

## 2022-06-12 NOTE — PHARMACY-ADMISSION MEDICATION HISTORY
Pharmacy Note - Admission Medication History    Pertinent Provider Information: None     ______________________________________________________________________    Prior To Admission (PTA) med list completed and updated in EMR.       No outpatient medications have been marked as taking for the 6/11/22 encounter (Hospital Encounter).       Information source(s): Patient and CareEverywhere/SureScripts  Method of interview communication: in-person    Summary of Changes to PTA Med List  New: None  Discontinued: Sertraline, clobetasol cream  Changed: None    Patient was asked about OTC/herbal products specifically.  PTA med list reflects this.    Allergies were reviewed, assessed, and updated with the patient.      Patient does not use any multi-dose medications prior to admission.    The information provided in this note is only as accurate as the sources available at the time of the update(s).    Thank you for the opportunity to participate in the care of this patient.    Stefanie Dumont [facility]  6/11/2022 10:03 PM

## 2022-06-12 NOTE — ANESTHESIA PREPROCEDURE EVALUATION
Anesthesia Pre-Procedure Evaluation    Patient: Chad M Johanson   MRN: [id number] : 1980        Procedure : Procedure(s):  APPENDECTOMY, LAPAROSCOPIC          History reviewed. No pertinent past medical history.   History reviewed. No pertinent surgical history.   No Known Allergies   Social History     Tobacco Use     Smoking status: Former Smoker     Quit date: 2013     Years since quittin.4     Smokeless tobacco: Never Used   Substance Use Topics     Alcohol use: No      Wt Readings from Last 1 Encounters:   22 99.8 kg (220 lb)        Anesthesia Evaluation   Pt has had prior anesthetic. Type: General.    No history of anesthetic complications       ROS/MED HX  ENT/Pulmonary:     (+) tobacco use,     Neurologic:  - neg neurologic ROS     Cardiovascular:  - neg cardiovascular ROS     METS/Exercise Tolerance:     Hematologic:  - neg hematologic  ROS     Musculoskeletal:       GI/Hepatic:  - neg GI/hepatic ROS     Renal/Genitourinary:  - neg Renal ROS     Endo:  - neg endo ROS     Psychiatric/Substance Use:     (+) psychiatric history depression     Infectious Disease:       Malignancy:       Other:            Physical Exam    Airway        Mallampati: II   TM distance: > 3 FB   Neck ROM: full     Respiratory Devices and Support         Dental  no notable dental history         Cardiovascular          Rhythm and rate: regular and normal     Pulmonary           breath sounds clear to auscultation           OUTSIDE LABS:  CBC:   Lab Results   Component Value Date    WBC 15.7 (H) 2022    HGB 15.2 2022    HCT 42.9 2022     2022     BMP:   Lab Results   Component Value Date     2022    POTASSIUM 4.1 2022    CHLORIDE 106 2022    CO2 21 (L) 2022    BUN 19 2022    CR 0.85 2022     2022     COAGS: No results found for: PTT, INR, FIBR  POC: No results found for: BGM, HCG, HCGS  HEPATIC:   Lab Results   Component Value  Date    ALBUMIN 4.2 06/11/2022    PROTTOTAL 7.2 06/11/2022    ALT 24 06/11/2022    AST 30 06/11/2022    ALKPHOS 98 06/11/2022    BILITOTAL 0.8 06/11/2022     OTHER:   Lab Results   Component Value Date    A1C 5.0 06/06/2013    RAUL 9.3 06/11/2022    TSH 1.57 04/10/2019       Anesthesia Plan    ASA Status:  2   NPO Status:  NPO Appropriate    Anesthesia Type: General.     - Airway: ETT              Consents    Anesthesia Plan(s) and associated risks, benefits, and realistic alternatives discussed. Questions answered and patient/representative(s) expressed understanding.     - Discussed: Risks, Benefits and Alternatives for BOTH SEDATION and the PROCEDURE were discussed     - Discussed with:  Patient         Postoperative Care    Pain management: Multi-modal analgesia.   PONV prophylaxis: Ondansetron (or other 5HT-3), Dexamethasone or Solumedrol     Comments:                Angela Livingston MD

## 2022-06-12 NOTE — OP NOTE
Operative Note    Name:  Chad M Johanson  PCP:  Carlos Joe  Procedure Date:  6/11/2022 - 6/12/2022    Laparoscopic appendectomy    Pre-Procedure Diagnosis:  Appendicitis [K37]     Post-Procedure Diagnosis:    same    Surgeon(s):  Rajendra Lennon MD    Circulator: Mellissa Antony RN  Scrub Person: LudaBert [email or web address]    Anesthesia Type:  General    History reviewed. No pertinent past medical history.    Patient Active Problem List    Diagnosis Date Noted     Acute appendicitis with localized peritonitis, without perforation, abscess, or gangrene 06/11/2022     Priority: Medium     Mental retardation 10/20/2009     Priority: Medium     Proteinuria 10/20/2009     Priority: Medium     Unilateral small kidney 10/20/2009     Priority: Medium     Tobacco abuse 10/20/2009     Priority: Medium     Depression 10/20/2009     Priority: Medium     Conduct disorder 10/20/2009     Priority: Medium       Findings:  Inflamed very necrotic appendix which was ruptured with a contained abscess in the retroperitoneum    Operative Report:  Consent was obtained.  The patient was taken to the operating room and placed in supine position and general anesthesia was administered by anesthesia staff.  A briefing was performed. A Osorio was placed. The patient was prepped and draped in a sterile manner.  A timeout was performed and the OR staff.  An incision was made at the umbilicus.  The Visiport was used to gain access to the peritoneal cavity.  It was insufflated to a pressure of 15 mm of CO2.  Under direct visualization a 5 mm port was placed above the pubic tubercle and a 5 mm port was placed in the right lower quadrant.  The appendix was identified and freed from surrounding adhesions.  The mesial appendix was taken down with electrocautery.   The appendix was ligated at its with 2-0 Vicryl Endoloops and divided between.  The appendix was placed in an Endo Catch bag and brought through the lower 5mm trocar.  The operative area  was irrigated out with normal saline and antibiotic solution.  This was removed.  I left a 15 Julian drain in gutter over this abscessed area.  This is sutured the skin with a silk suture.  All trochars and CO2 were removed and evacuated.  .. The incisions were closed with a 4-0 Monocryl suture in a subcuticular fashion.  20 mL of Marcaine were injected in the incisions Steri-Strips and sterile dressings were applied.  The Osorio was removed the patient was extubated and transferred to PACU in stable condition.  All sponge and needle counts were correct.      Estimated Blood Loss:   5 mL    Specimens:    ID Type Source Tests Collected by Time Destination   1 :  Tissue Appendix SURGICAL PATHOLOGY EXAM Rajendra Lennon MD 6/12/2022 11:31 AM           Drains:   Closed/Suction Drain 1 Right;Distal Abdomen Bulb 15 Niuean (Active)   Site Description Leaking at site 06/12/22 1155   Dressing Status Drainage - Minimal 06/12/22 1155   Drainage Appearance Serosanguenous 06/12/22 1155   Status To bulb suction 06/12/22 1155       Complications:    None    Rajendra Lennon MD   [facility]    Date: 6/12/2022  Time: 12:36 PM

## 2022-06-12 NOTE — ANESTHESIA CARE TRANSFER NOTE
"  Patient: Chad M Johanson    Procedure: Procedure(s):  APPENDECTOMY, LAPAROSCOPIC       Diagnosis: Appendicitis [K37]  Diagnosis Additional Information: No value filed.    Anesthesia Type:   General     Note:/77   Pulse 88   Temp 37.7  C (99.8  F)   Resp 16   Ht 1.905 m (6' 3\")   Wt 99.8 kg (220 lb)   SpO2 94%   BMI 27.50 kg/m        Oropharynx: oropharynx clear of all foreign objects  Level of Consciousness: awake  Oxygen Supplementation: face mask  Level of Supplemental Oxygen (L/min / FiO2): 6      Vital Signs Stable: post-procedure vital signs reviewed and stable  Report to RN Given: handoff report given  Patient transferred to: PACU    Handoff Report: Identifed the Patient, Identified the Reponsible Provider, Reviewed the pertinent medical history, Discussed the surgical course, Reviewed Intra-OP anesthesia mangement and issues during anesthesia, Set expectations for post-procedure period and Allowed opportunity for questions and acknowledgement of understanding      Vitals:  Vitals Value Taken Time   BP     Temp     Pulse     Resp     SpO2         Electronically Signed By: Sandrine Posadas CRNA, DI CHRISTIAN  June 12, 2022  11:51 AM  "

## 2022-06-12 NOTE — ANESTHESIA PROCEDURE NOTES
Airway       Patient location during procedure: OR       Procedure Start/Stop Times: 6/12/2022 10:44 AM  Staff -        CRNA: Sandrine Posadas APRN CRNA       Performed By: CRNA  Consent for Airway        Urgency: elective  Indications and Patient Condition       Indications for airway management: nichole-procedural       Induction type:intravenous       Mask difficulty assessment: 1 - vent by mask    Final Airway Details       Final airway type: endotracheal airway       Successful airway: ETT - single  Endotracheal Airway Details        ETT size (mm): 7.5       Cuffed: yes       Successful intubation technique: direct laryngoscopy       DL Blade Type: Edwards 2       Grade View of Cords: 1       Adjucts: stylet       Position: Right       Measured from: lips       Secured at (cm): 23       Bite block used: None    Post intubation assessment        Placement verified by: capnometry, equal breath sounds and chest rise        Number of attempts at approach: 1       Number of other approaches attempted: 0       Secured with: silk tape       Ease of procedure: easy       Dentition: Intact    Medication(s) Administered   Medication Administration Time: 6/12/2022 10:44 AM

## 2022-06-12 NOTE — ED PROVIDER NOTES
[facility] EMERGENCY DEPARTMENT  PHYSICIAN NOTE    MRN: [id number]    FINAL IMPRESSION     1. Acute appendicitis with localized peritonitis, without perforation, abscess, or gangrene          ED COURSE & MDM       8:16 PM Initial history and physical performed. Plan of care discussed. PPE utilized includes N95 mask, goggles, and gloves.    9:39 PM Paged general surgery.    9:43 PM Patient reevaluated and updated.    Patient presented for right lower quadrant pain. Initial vital signs reassuring.  Patient's symptoms are classic for appendicitis and CT confirms this.  No sign of perforation.  Patient has leukocytosis but labs are otherwise reassuring, no suggestion of sepsis.  Zosyn ordered, patient will be admitted for observation and will go to the OR in the morning.  Differential diagnosis considered includes bowel obstruction, cholecystitis, hepatitis, diverticulitis, colitis, and UTI. Patient admitted for further management and observation.      ===================================================================    HPI     Chad M Johanson is a 41 year old male with no relevant significant PMH on file presenting with abdominal pain. Patient states that he has had constant abdominal pain in his right lower quadrant that started last night. The patient initially thought that it was gas until it didn't resolve this morning. He reports that laying down does not help. Moving provokes the pain. He also reports that breathing is painful. The patient denies nausea except for when he tried to induce it himself to make himself feel better. He also reports constipation and diarrhea. The patient took Ibuprofen and suboxone. Denies dysuria, blood in urine, swelling or pain scrotum or testicles, or any abdominal surgeries.      ROS  All other ROS negative.    Problem list, medications, allergies, PMH, PSH, family history, and social history reviewed and updated as able in Epic.      PHYSICAL EXAM  "    Vitals:    06/11/22 1750 06/11/22 2117   BP: (!) 155/85    Pulse: 85 83   Resp: 20    Temp: 97  F (36.1  C)    TempSrc: Temporal    SpO2: 97% 97%   Weight: 99.8 kg (220 lb)    Height: 1.905 m (6' 3\")         Constitutional: Alert, no acute distress.  HENT: Normocephalic, atraumatic.  Eyes: Sclera anicteric, EOMI.  CV: Normal rate, regular rhythm. Peripheral pulses intact.  Pulm: Non-labored respirations, CTAB.  Abdomen: Right lower quadrant tenderness, no rebound or guarding.  MSK: No major deformities. Neck supple.  Neuro: A/O x3. Normal speech. No focal deficits.  Skin: Warm and dry, no rash.  Psych: Cooperative, able to follow commands. Intact attention.      TESTING   All testing reviewed and interpreted.    EKG  None    LABS  Labs Ordered and Resulted from Time of ED Arrival to Time of ED Departure   BASIC METABOLIC PANEL - Abnormal       Result Value    Sodium 138      Potassium 4.1      Chloride 106      Carbon Dioxide (CO2) 21 (*)     Anion Gap 11      Urea Nitrogen 19      Creatinine 0.85      Calcium 9.3      Glucose 121      GFR Estimate >90     CBC WITH PLATELETS AND DIFFERENTIAL - Abnormal    WBC Count 15.7 (*)     RBC Count 4.95      Hemoglobin 15.2      Hematocrit 42.9      MCV 87      MCH 30.7      MCHC 35.4      RDW 13.3      Platelet Count 234      % Neutrophils 85      % Lymphocytes 10      % Monocytes 5      % Eosinophils 0      % Basophils 0      % Immature Granulocytes 0      NRBCs per 100 WBC 0      Absolute Neutrophils 13.4 (*)     Absolute Lymphocytes 1.6      Absolute Monocytes 0.7      Absolute Eosinophils 0.0      Absolute Basophils 0.1      Absolute Immature Granulocytes 0.1      Absolute NRBCs 0.0     HEPATIC FUNCTION PANEL - Normal    Bilirubin Total 0.8      Bilirubin Direct 0.2      Protein Total 7.2      Albumin 4.2      Alkaline Phosphatase 98      AST 30      ALT 24     COVID-19 VIRUS (CORONAVIRUS) BY PCR       IMAGING  CT Abdomen Pelvis w Contrast   Final Result   IMPRESSION: "    1.  Patient has acute appendicitis with at least 3 fecaliths and barely enhancing appendiceal wall. No abscess or free fluid at this time.            I attest that Marisol Angulo is acting in a scribe capacity, has observed my performance of services, and has documented them in accordance with my direction.       Eduar Zamora MD  06/11/22 8019

## 2022-06-12 NOTE — CONSULTS
[facility] Surgery Consultation    Chad M Johanson MRN# [id number]   Age: 41 year old YOB: 1980     Date of Admission:  2022    Reason for consult: Abdominal pain       Requesting physician: Dr. Zamora       Level of consult: Consult, follow and place orders           Assessment and Plan:   Assessment:   Appendicitis      Plan:   NPO  IV abx  OR for laparoscopic appendectomy             Chief Complaint:   Abdominal pain     History is obtained from the patient         History of Present Illness:   This patient is a 41 year old  male without a significant past medical history who presents with abdominal pain for the last day and a half time.  Kind of generalized and started Mooers right lower quadrant.  Worsened throughout the day so he came in for evaluation emergency room found evidence appendicitis by CT scan.  Started on IV antibiotics and admitted consulted about taking definitive care for him.              Past Medical History:   I have reviewed this patient's past medical history  History reviewed. No pertinent past medical history.          Past Surgical History:   History reviewed. No pertinent surgical history.          Social History:     Social History     Tobacco Use     Smoking status: Former Smoker     Quit date: 2013     Years since quittin.4     Smokeless tobacco: Never Used   Substance Use Topics     Alcohol use: No             Family History:     Family History   Problem Relation Age of Onset     Diabetes Father      Cerebrovascular Disease Paternal Uncle      Cerebrovascular Disease Paternal Grandfather      Anxiety Disorder Mother      Family history reviewed and updated in [employer]          Immunizations:     Immunization History   Administered Date(s) Administered     COVID-19,PF,Pfizer (12+ Yrs) 2021     Influenza Vaccine IM > 6 months Valent IIV4 (Alfuria,Fluzone) 2019     Influenza Vaccine, 6+MO IM (QUADRIVALENT W/PRESERVATIVES) 09/15/2016  "    Tdap (Adacel,Boostrix) 06/06/2013             Allergies:   All allergies reviewed and addressed  No Known Allergies          Medications:     Current Facility-Administered Medications   Medication     lactated ringers infusion     lidocaine (LMX4) cream     lidocaine 1 % 0.1-1 mL     sodium chloride (PF) 0.9% PF flush 3 mL     sodium chloride (PF) 0.9% PF flush 3 mL     [Auto Hold] sodium chloride 0.9% infusion             Review of Systems:   The Review of Systems is negative other than noted in the HPI          Physical Exam:   Vitals were reviewed  /77   Pulse 88   Temp 97  F (36.1  C) (Temporal)   Resp 16   Ht 1.905 m (6' 3\")   Wt 99.8 kg (220 lb)   SpO2 94%   BMI 27.50 kg/m      EXAM:  Constitutional: healthy, alert and no distress   Cardiovascular: negative, PMI normal. No lifts, heaves, or thrills. RRR. No murmurs, clicks gallops or rub  Respiratory: negative, Percussion normal. Good diaphragmatic excursion. Lungs clear  Head: Normocephalic. No masses, lesions, tenderness or abnormalities  Neck: Neck supple. No adenopathy. Thyroid symmetric, normal size,, Carotids without bruits.  NEURO: Gait normal. Reflexes normal and symmetric. Sensation grossly WNL.  SKIN: no suspicious lesions or rashes  LYMPH: Normal cervical lymph nodes        Abdomen:   Soft tender throughout, more over right lower abdomen               Data:   All laboratory data reviewed  Results for orders placed or performed during the hospital encounter of 06/11/22 (from the past 24 hour(s))   CBC with Platelets & Differential    Narrative    The following orders were created for panel order CBC with Platelets & Differential.  Procedure                               Abnormality         Status                     ---------                               -----------         ------                     CBC with platelets and d...[[id number]]  Abnormal            Final result                 Please view results for these tests on the " individual orders.   Basic metabolic panel   Result Value Ref Range    Sodium 138 136 - 145 mmol/L    Potassium 4.1 3.5 - 5.0 mmol/L    Chloride 106 98 - 107 mmol/L    Carbon Dioxide (CO2) 21 (L) 22 - 31 mmol/L    Anion Gap 11 5 - 18 mmol/L    Urea Nitrogen 19 8 - 22 mg/dL    Creatinine 0.85 0.70 - 1.30 mg/dL    Calcium 9.3 8.5 - 10.5 mg/dL    Glucose 121 70 - 125 mg/dL    GFR Estimate >90 >60 mL/min/1.73m2   Hepatic function panel   Result Value Ref Range    Bilirubin Total 0.8 0.0 - 1.0 mg/dL    Bilirubin Direct 0.2 <=0.5 mg/dL    Protein Total 7.2 6.0 - 8.0 g/dL    Albumin 4.2 3.5 - 5.0 g/dL    Alkaline Phosphatase 98 45 - 120 U/L    AST 30 0 - 40 U/L    ALT 24 0 - 45 U/L   CBC with platelets and differential   Result Value Ref Range    WBC Count 15.7 (H) 4.0 - 11.0 10e3/uL    RBC Count 4.95 4.40 - 5.90 10e6/uL    Hemoglobin 15.2 13.3 - 17.7 g/dL    Hematocrit 42.9 40.0 - 53.0 %    MCV 87 78 - 100 fL    MCH 30.7 26.5 - 33.0 pg    MCHC 35.4 31.5 - 36.5 g/dL    RDW 13.3 10.0 - 15.0 %    Platelet Count 234 150 - 450 10e3/uL    % Neutrophils 85 %    % Lymphocytes 10 %    % Monocytes 5 %    % Eosinophils 0 %    % Basophils 0 %    % Immature Granulocytes 0 %    NRBCs per 100 WBC 0 <1 /100    Absolute Neutrophils 13.4 (H) 1.6 - 8.3 10e3/uL    Absolute Lymphocytes 1.6 0.8 - 5.3 10e3/uL    Absolute Monocytes 0.7 0.0 - 1.3 10e3/uL    Absolute Eosinophils 0.0 0.0 - 0.7 10e3/uL    Absolute Basophils 0.1 0.0 - 0.2 10e3/uL    Absolute Immature Granulocytes 0.1 <=0.4 10e3/uL    Absolute NRBCs 0.0 10e3/uL   CT Abdomen Pelvis w Contrast    Narrative    EXAM: CT ABDOMEN PELVIS W CONTRAST  LOCATION: [facility]  DATE/TIME: 6/11/2022 9:00 PM    INDICATION: RLQ abdominal pain  COMPARISON: None.  TECHNIQUE: CT scan of the abdomen and pelvis was performed following injection of IV contrast. Multiplanar reformats were obtained. Dose reduction techniques were used.  CONTRAST: isovue 370 100ml    FINDINGS:   LOWER  CHEST: Minimal subpleural atelectasis in the right base.    HEPATOBILIARY: There is a subcapsular ill-defined, 1.5 cm hypodense lesion with faint, nodular hyperdensities along the periphery in the right lobe posteriorly suggestive of a small hemangioma.    PANCREAS: Normal.    SPLEEN: Normal.    ADRENAL GLANDS: Normal.    KIDNEYS/BLADDER: Normal.    BOWEL: The appendix is abnormal. There are 2 fecaliths at the base and another in the midportion. It is distended to 1.4 cm. There is periappendiceal fat stranding and the wall is barely enhancing. No abscess or free fluid. Few reactive nodes in the   ileocolic mesentery. Minimal stool in the right colon. Rest is collapsed. Sigmoid is redundant.    LYMPH NODES: Normal.    VASCULATURE: Mild arterial calcifications.    PELVIC ORGANS: Normal.    MUSCULOSKELETAL: Sacralization of L5 on the right. Moderate degenerative changes at L4-L5 with Schmorl's node along the left side of L4. Benign-appearing bony spur projecting anteriorly from right iliac wing. No suspicious lesions.      Impression    IMPRESSION:   1.  Patient has acute appendicitis with at least 3 fecaliths and barely enhancing appendiceal wall. No abscess or free fluid at this time.   Asymptomatic COVID-19 Virus (Coronavirus) by PCR Nose    Specimen: Nose; Swab   Result Value Ref Range    SARS CoV2 PCR Negative Negative    Narrative    Testing was performed using the chirag  SARS-CoV-2 & Influenza A/B Assay on the chirag  Patience  System.  This test should be ordered for the detection of SARS-COV-2 in individuals who meet SARS-CoV-2 clinical and/or epidemiological criteria. Test performance is unknown in asymptomatic patients.  This test is for in vitro diagnostic use under the FDA EUA for laboratories certified under CLIA to perform moderate and/or high complexity testing. This test has not been FDA cleared or approved.  A negative test does not rule out the presence of PCR inhibitors in the specimen or target RNA in  concentration below the limit of detection for the assay. The possibility of a false negative should be considered if the patient's recent exposure or clinical presentation suggests COVID-19.  [facility] are certified under the Clinical Laboratory Improvement Amendments of 1988 (CLIA-88) as qualified to perform moderate and/or high complexity laboratory testing.     All imaging studies reviewed by me.     Attestation:  I have reviewed today's vital signs, notes, medications, labs and imaging.    Rajendra Lennon MD

## 2022-06-12 NOTE — ED NOTES
ED Course as of 06/12/22 0822   Sun Jun 12, 2022   0821 Patient is a surgical patient awaiting appendectomy at 11 AM.  He was not admitted to the hospital so I took over his care at 820 to assist with IV fluids and pain management.  I ordered him some IV morphine.          Laura Hardwick MD  06/12/22 0188

## 2022-06-13 LAB
BASOPHILS # BLD AUTO: 0 10E3/UL (ref 0–0.2)
BASOPHILS NFR BLD AUTO: 0 %
EOSINOPHIL # BLD AUTO: 0 10E3/UL (ref 0–0.7)
EOSINOPHIL NFR BLD AUTO: 0 %
ERYTHROCYTE [DISTWIDTH] IN BLOOD BY AUTOMATED COUNT: 13.2 % (ref 10–15)
GLUCOSE BLDC GLUCOMTR-MCNC: 119 MG/DL (ref 70–99)
HCT VFR BLD AUTO: 36.6 % (ref 40–53)
HGB BLD-MCNC: 12.9 G/DL (ref 13.3–17.7)
HOLD SPECIMEN: NORMAL
IMM GRANULOCYTES # BLD: 0.1 10E3/UL
IMM GRANULOCYTES NFR BLD: 1 %
LYMPHOCYTES # BLD AUTO: 1.4 10E3/UL (ref 0.8–5.3)
LYMPHOCYTES NFR BLD AUTO: 11 %
MCH RBC QN AUTO: 31.2 PG (ref 26.5–33)
MCHC RBC AUTO-ENTMCNC: 35.2 G/DL (ref 31.5–36.5)
MCV RBC AUTO: 89 FL (ref 78–100)
MONOCYTES # BLD AUTO: 1 10E3/UL (ref 0–1.3)
MONOCYTES NFR BLD AUTO: 8 %
NEUTROPHILS # BLD AUTO: 10.2 10E3/UL (ref 1.6–8.3)
NEUTROPHILS NFR BLD AUTO: 80 %
NRBC # BLD AUTO: 0 10E3/UL
NRBC BLD AUTO-RTO: 0 /100
PLATELET # BLD AUTO: 208 10E3/UL (ref 150–450)
RBC # BLD AUTO: 4.13 10E6/UL (ref 4.4–5.9)
WBC # BLD AUTO: 12.5 10E3/UL (ref 4–11)

## 2022-06-13 PROCEDURE — 250N000013 HC RX MED GY IP 250 OP 250 PS 637: Performed by: SPECIALIST

## 2022-06-13 PROCEDURE — 36415 COLL VENOUS BLD VENIPUNCTURE: CPT | Performed by: SPECIALIST

## 2022-06-13 PROCEDURE — 250N000011 HC RX IP 250 OP 636: Performed by: SPECIALIST

## 2022-06-13 PROCEDURE — 120N000001 HC R&B MED SURG/OB

## 2022-06-13 PROCEDURE — 85025 COMPLETE CBC W/AUTO DIFF WBC: CPT | Performed by: SPECIALIST

## 2022-06-13 RX ADMIN — SENNOSIDES AND DOCUSATE SODIUM 1 TABLET: 8.6; 5 TABLET ORAL at 21:27

## 2022-06-13 RX ADMIN — HYDROMORPHONE HYDROCHLORIDE 0.4 MG: 0.2 INJECTION, SOLUTION INTRAMUSCULAR; INTRAVENOUS; SUBCUTANEOUS at 09:21

## 2022-06-13 RX ADMIN — SENNOSIDES AND DOCUSATE SODIUM 1 TABLET: 8.6; 5 TABLET ORAL at 09:06

## 2022-06-13 RX ADMIN — OXYCODONE HYDROCHLORIDE 10 MG: 5 TABLET ORAL at 22:26

## 2022-06-13 RX ADMIN — OXYCODONE HYDROCHLORIDE 10 MG: 5 TABLET ORAL at 06:46

## 2022-06-13 RX ADMIN — ACETAMINOPHEN 975 MG: 325 TABLET ORAL at 21:26

## 2022-06-13 RX ADMIN — OXYCODONE HYDROCHLORIDE 10 MG: 5 TABLET ORAL at 16:21

## 2022-06-13 RX ADMIN — HYDROMORPHONE HYDROCHLORIDE 0.4 MG: 0.2 INJECTION, SOLUTION INTRAMUSCULAR; INTRAVENOUS; SUBCUTANEOUS at 13:20

## 2022-06-13 RX ADMIN — PIPERACILLIN AND TAZOBACTAM 3.38 G: 3; .375 INJECTION, POWDER, LYOPHILIZED, FOR SOLUTION INTRAVENOUS at 09:06

## 2022-06-13 RX ADMIN — PIPERACILLIN AND TAZOBACTAM 3.38 G: 3; .375 INJECTION, POWDER, LYOPHILIZED, FOR SOLUTION INTRAVENOUS at 01:30

## 2022-06-13 RX ADMIN — HEPARIN SODIUM 5000 UNITS: 10000 INJECTION, SOLUTION INTRAVENOUS; SUBCUTANEOUS at 06:22

## 2022-06-13 RX ADMIN — KETOROLAC TROMETHAMINE 15 MG: 15 INJECTION, SOLUTION INTRAMUSCULAR; INTRAVENOUS at 01:29

## 2022-06-13 RX ADMIN — HEPARIN SODIUM 5000 UNITS: 10000 INJECTION, SOLUTION INTRAVENOUS; SUBCUTANEOUS at 13:14

## 2022-06-13 RX ADMIN — ACETAMINOPHEN 975 MG: 325 TABLET ORAL at 06:23

## 2022-06-13 RX ADMIN — HYDROMORPHONE HYDROCHLORIDE 0.4 MG: 0.2 INJECTION, SOLUTION INTRAMUSCULAR; INTRAVENOUS; SUBCUTANEOUS at 20:14

## 2022-06-13 RX ADMIN — KETOROLAC TROMETHAMINE 15 MG: 15 INJECTION, SOLUTION INTRAMUSCULAR; INTRAVENOUS at 06:32

## 2022-06-13 RX ADMIN — HEPARIN SODIUM 5000 UNITS: 10000 INJECTION, SOLUTION INTRAVENOUS; SUBCUTANEOUS at 22:26

## 2022-06-13 RX ADMIN — PIPERACILLIN AND TAZOBACTAM 3.38 G: 3; .375 INJECTION, POWDER, LYOPHILIZED, FOR SOLUTION INTRAVENOUS at 17:35

## 2022-06-13 RX ADMIN — ACETAMINOPHEN 975 MG: 325 TABLET ORAL at 13:14

## 2022-06-13 RX ADMIN — POLYETHYLENE GLYCOL 3350 17 G: 17 POWDER, FOR SOLUTION ORAL at 17:35

## 2022-06-13 RX ADMIN — OXYCODONE HYDROCHLORIDE 10 MG: 5 TABLET ORAL at 11:22

## 2022-06-13 ASSESSMENT — ACTIVITIES OF DAILY LIVING (ADL)
ADLS_ACUITY_SCORE: 18

## 2022-06-13 NOTE — PLAN OF CARE
Problem: Plan of Care - These are the overarching goals to be used throughout the patient stay.    Goal: Absence of Hospital-Acquired Illness or Injury  Intervention: Identify and Manage Fall Risk  Recent Flowsheet Documentation  Taken 6/13/2022 1600 by Stephon Larkin RN  Safety Promotion/Fall Prevention:   clutter free environment maintained   nonskid shoes/slippers when out of bed   patient and family education     Problem: Plan of Care - These are the overarching goals to be used throughout the patient stay.    Goal: Absence of Hospital-Acquired Illness or Injury  Intervention: Prevent Skin Injury  Recent Flowsheet Documentation  Taken 6/13/2022 1600 by Stephon Larkin RN  Body Position: position changed independently     Problem: Plan of Care - These are the overarching goals to be used throughout the patient stay.    Goal: Absence of Hospital-Acquired Illness or Injury  Intervention: Prevent and Manage VTE (Venous Thromboembolism) Risk  Recent Flowsheet Documentation  Taken 6/13/2022 1621 by Stephon Larkin RN  Activity Management: ambulated in [place]     Problem: Pain Acute  Goal: Acceptable Pain Control and Functional Ability  Outcome: Ongoing, Progressing  Intervention: Prevent or Manage Pain  Recent Flowsheet Documentation  Taken 6/13/2022 1600 by Stephon Larkin RN  Medication Review/Management: medications reviewed   Goal Outcome Evaluation:    Scheduled and PRN pain medications given per MAR to control pain. Patient ambulated x2 this evening in [place].      Stephon Larkin RN

## 2022-06-13 NOTE — PLAN OF CARE
Patient rating pain 6/10 after Oxycodone. Hydromorphone 0.2 mg given with good relief. Next dose requested IV Hydromorphone for pain 8/10. Receiving scheduled Tylenol and Toradol. Ambulated in [place] with 1 assist. Voiding without problem Tolerating clear liquids. Denies nausea.      Problem: Pain Acute  Goal: Acceptable Pain Control and Functional Ability  Intervention: Develop Pain Management Plan  Recent Flowsheet Documentation  Taken 6/12/2022 1657 by Saundra Forrester, RN  Pain Management Interventions: medication (see MAR)  Taken 6/12/2022 1600 by Saundra Forrester, RN  Pain Management Interventions:   cold applied   declines

## 2022-06-13 NOTE — PROGRESS NOTES
General Surgery Progress Note:    Hospital Day # 1    ASSESSMENT:   1. Acute appendicitis with localized peritonitis, without perforation, abscess, or gangrene        Chad M Johanson is a 41 year old male s/p appendectomy on 6/13/22. Patient still with persistent pain and tenderness. WBC coming down, 12.5 today from 15. Drain serosanguinous.    PLAN:   Activity and diet advance as tolerated  Transition to oral pain medication today  Continue IV antibiotics         SUBJECTIVE:   Chad M Johanson is resting in bed. Denies n/v and has an appetite. He reports that his pain is improving but it is still persistent. He did go for a walk yesterday. He endorses passing gas but no bowel movement.    Patient Vitals for the past 24 hrs:   BP Temp Temp src Pulse Resp SpO2   06/13/22 0815 114/66 98.4  F (36.9  C) Oral 74 16 98 %   06/12/22 2345 119/70 98.4  F (36.9  C) Oral 59 18 96 %   06/12/22 2120 127/67 98.6  F (37  C) Oral 75 18 95 %   06/12/22 1808 117/67 97.7  F (36.5  C) Oral 78 20 94 %   06/12/22 1654 131/85 98.9  F (37.2  C) Oral 80 18 96 %   06/12/22 1600 136/78 97.7  F (36.5  C) Oral 80 18 96 %   06/12/22 1359 137/79 -- -- 84 18 94 %   06/12/22 1327 134/73 98.2  F (36.8  C) Oral 87 18 95 %   06/12/22 1258 -- -- -- -- -- 99 %   06/12/22 1250 (!) 141/72 98.9  F (37.2  C) Temporal 85 16 98 %   06/12/22 1240 (!) 141/81 -- -- 81 11 98 %   06/12/22 1230 136/79 -- -- 81 12 98 %   06/12/22 1220 (!) 142/81 -- -- 82 16 100 %   06/12/22 1214 -- -- -- -- -- 99 %   06/12/22 1210 128/75 -- -- 81 14 100 %   06/12/22 1200 139/85 -- -- 86 18 93 %   06/12/22 1150 134/73 98  F (36.7  C) Temporal 92 19 100 %       Physical Exam:  General: NAD, pleasant  CV:RRR  LUNGS:CTA bilaterally  ABD: Soft, TTP in RLQ and LLQ. Drain with serosanguinous output.  EXT:no CCE    Admission on 06/11/2022   Component Date Value     Sodium 06/11/2022 138      Potassium 06/11/2022 4.1      Chloride 06/11/2022 106      Carbon Dioxide (CO2) 06/11/2022 21 (A)      Anion Gap 06/11/2022 11      Urea Nitrogen 06/11/2022 19      Creatinine 06/11/2022 0.85      Calcium 06/11/2022 9.3      Glucose 06/11/2022 121      GFR Estimate 06/11/2022 >90      Bilirubin Total 06/11/2022 0.8      Bilirubin Direct 06/11/2022 0.2      Protein Total 06/11/2022 7.2      Albumin 06/11/2022 4.2      Alkaline Phosphatase 06/11/2022 98      AST 06/11/2022 30      ALT 06/11/2022 24      WBC Count 06/11/2022 15.7 (A)     RBC Count 06/11/2022 4.95      Hemoglobin 06/11/2022 15.2      Hematocrit 06/11/2022 42.9      MCV 06/11/2022 87      MCH 06/11/2022 30.7      MCHC 06/11/2022 35.4      RDW 06/11/2022 13.3      Platelet Count 06/11/2022 234      % Neutrophils 06/11/2022 85      % Lymphocytes 06/11/2022 10      % Monocytes 06/11/2022 5      % Eosinophils 06/11/2022 0      % Basophils 06/11/2022 0      % Immature Granulocytes 06/11/2022 0      NRBCs per 100 WBC 06/11/2022 0      Absolute Neutrophils 06/11/2022 13.4 (A)     Absolute Lymphocytes 06/11/2022 1.6      Absolute Monocytes 06/11/2022 0.7      Absolute Eosinophils 06/11/2022 0.0      Absolute Basophils 06/11/2022 0.1      Absolute Immature Granul* 06/11/2022 0.1      Absolute NRBCs 06/11/2022 0.0      SARS CoV2 PCR 06/11/2022 Negative      Platelet Count 06/12/2022 222      GLUCOSE BY METER POCT 06/13/2022 119 (A)     WBC Count 06/13/2022 12.5 (A)     RBC Count 06/13/2022 4.13 (A)     Hemoglobin 06/13/2022 12.9 (A)     Hematocrit 06/13/2022 36.6 (A)     MCV 06/13/2022 89      MCH 06/13/2022 31.2      MCHC 06/13/2022 35.2      RDW 06/13/2022 13.2      Platelet Count 06/13/2022 208      % Neutrophils 06/13/2022 80      % Lymphocytes 06/13/2022 11      % Monocytes 06/13/2022 8      % Eosinophils 06/13/2022 0      % Basophils 06/13/2022 0      % Immature Granulocytes 06/13/2022 1      NRBCs per 100 WBC 06/13/2022 0      Absolute Neutrophils 06/13/2022 10.2 (A)     Absolute Lymphocytes 06/13/2022 1.4      Absolute Monocytes 06/13/2022 1.0      Absolute  Eosinophils 06/13/2022 0.0      Absolute Basophils 06/13/2022 0.0      Absolute Immature Granul* 06/13/2022 0.1      Absolute NRBCs 06/13/2022 0.0         ELICIA Garcia PA-C  [facility]  [address] [place] [id number]  Phone- [phone]  Fax- [phone]

## 2022-06-13 NOTE — PLAN OF CARE
Problem: Pain Acute  Goal: Acceptable Pain Control and Functional Ability  Outcome: Ongoing, Not Progressing  Intervention: Develop Pain Management Plan  Recent Flowsheet Documentation  Taken 6/12/2022 2336 by Senia Cardoza RN  Pain Management Interventions: medication (see MAR)  Taken 6/12/2022 2317 by Senia Cardoza RN  Pain Management Interventions: medication (see MAR)  Intervention: Prevent or Manage Pain  Recent Flowsheet Documentation  Taken 6/12/2022 2336 by Senia Cardoza RN  Medication Review/Management: medications reviewed   Goal Outcome Evaluation:    Plan of Care Reviewed With: patient      Patient complained of abdominal pain 8/10. Oxycodone given with poor results. Dilaudid 0.4 with good results. No flatus or BM yet. VSS. No complaints of nausea.

## 2022-06-13 NOTE — PLAN OF CARE
Problem: Pain Acute  Goal: Acceptable Pain Control and Functional Ability  Outcome: Ongoing, Progressing  Intervention: Prevent or Manage Pain   Pain improving throughout shift with scheduled and PRN pain medications. Pt staying away from IV pain medications this afternoon, working on controlling with PO and ice.     Problem: Plan of Care - These are the overarching goals to be used throughout the patient stay.    Goal: Absence of Hospital-Acquired Illness or Injury  Intervention: Prevent and Manage VTE (Venous Thromboembolism) Risk   SCD pumps on and patient up and ambulating in the halls.    Pt reports no nausea or vomiting. Passing flatus. SUZANNA drain remains to bulb suction and having serosanguinous output.     CECY HERRERA RN

## 2022-06-14 VITALS
BODY MASS INDEX: 27.35 KG/M2 | WEIGHT: 220 LBS | RESPIRATION RATE: 16 BRPM | TEMPERATURE: 98.2 F | HEIGHT: 75 IN | HEART RATE: 66 BPM | SYSTOLIC BLOOD PRESSURE: 118 MMHG | OXYGEN SATURATION: 96 % | DIASTOLIC BLOOD PRESSURE: 62 MMHG

## 2022-06-14 LAB
ERYTHROCYTE [DISTWIDTH] IN BLOOD BY AUTOMATED COUNT: 13.2 % (ref 10–15)
HCT VFR BLD AUTO: 38.1 % (ref 40–53)
HGB BLD-MCNC: 12.5 G/DL (ref 13.3–17.7)
MCH RBC QN AUTO: 28.2 PG (ref 26.5–33)
MCHC RBC AUTO-ENTMCNC: 32.8 G/DL (ref 31.5–36.5)
MCV RBC AUTO: 86 FL (ref 78–100)
PATH REPORT.COMMENTS IMP SPEC: NORMAL
PATH REPORT.COMMENTS IMP SPEC: NORMAL
PATH REPORT.FINAL DX SPEC: NORMAL
PATH REPORT.GROSS SPEC: NORMAL
PATH REPORT.MICROSCOPIC SPEC OTHER STN: NORMAL
PATH REPORT.RELEVANT HX SPEC: NORMAL
PHOTO IMAGE: NORMAL
PLATELET # BLD AUTO: 202 10E3/UL (ref 150–450)
RBC # BLD AUTO: 4.43 10E6/UL (ref 4.4–5.9)
WBC # BLD AUTO: 9.1 10E3/UL (ref 4–11)

## 2022-06-14 PROCEDURE — 88304 TISSUE EXAM BY PATHOLOGIST: CPT | Mod: 26 | Performed by: PATHOLOGY

## 2022-06-14 PROCEDURE — 85027 COMPLETE CBC AUTOMATED: CPT | Performed by: SPECIALIST

## 2022-06-14 PROCEDURE — 36415 COLL VENOUS BLD VENIPUNCTURE: CPT | Performed by: SPECIALIST

## 2022-06-14 PROCEDURE — 250N000013 HC RX MED GY IP 250 OP 250 PS 637: Performed by: SPECIALIST

## 2022-06-14 PROCEDURE — 250N000011 HC RX IP 250 OP 636: Performed by: SPECIALIST

## 2022-06-14 RX ORDER — ACETAMINOPHEN 325 MG/1
650 TABLET ORAL EVERY 4 HOURS PRN
COMMUNITY
Start: 2022-06-15 | End: 2022-10-28

## 2022-06-14 RX ORDER — OXYCODONE HYDROCHLORIDE 5 MG/1
5 TABLET ORAL EVERY 6 HOURS PRN
Qty: 12 TABLET | Refills: 0 | Status: SHIPPED | OUTPATIENT
Start: 2022-06-14 | End: 2022-06-16

## 2022-06-14 RX ADMIN — HEPARIN SODIUM 5000 UNITS: 10000 INJECTION, SOLUTION INTRAVENOUS; SUBCUTANEOUS at 05:03

## 2022-06-14 RX ADMIN — SENNOSIDES AND DOCUSATE SODIUM 1 TABLET: 8.6; 5 TABLET ORAL at 09:34

## 2022-06-14 RX ADMIN — ACETAMINOPHEN 975 MG: 325 TABLET ORAL at 05:03

## 2022-06-14 RX ADMIN — OXYCODONE HYDROCHLORIDE 10 MG: 5 TABLET ORAL at 05:03

## 2022-06-14 RX ADMIN — OXYCODONE HYDROCHLORIDE 10 MG: 5 TABLET ORAL at 09:31

## 2022-06-14 RX ADMIN — OXYCODONE HYDROCHLORIDE 10 MG: 5 TABLET ORAL at 13:35

## 2022-06-14 RX ADMIN — HEPARIN SODIUM 5000 UNITS: 10000 INJECTION, SOLUTION INTRAVENOUS; SUBCUTANEOUS at 13:38

## 2022-06-14 RX ADMIN — PIPERACILLIN AND TAZOBACTAM 3.38 G: 3; .375 INJECTION, POWDER, LYOPHILIZED, FOR SOLUTION INTRAVENOUS at 00:35

## 2022-06-14 RX ADMIN — POLYETHYLENE GLYCOL 3350 17 G: 17 POWDER, FOR SOLUTION ORAL at 09:34

## 2022-06-14 RX ADMIN — PIPERACILLIN AND TAZOBACTAM 3.38 G: 3; .375 INJECTION, POWDER, LYOPHILIZED, FOR SOLUTION INTRAVENOUS at 09:33

## 2022-06-14 ASSESSMENT — ACTIVITIES OF DAILY LIVING (ADL)
ADLS_ACUITY_SCORE: 18

## 2022-06-14 NOTE — PLAN OF CARE
Problem: Plan of Care - These are the overarching goals to be used throughout the patient stay.    Goal: Plan of Care Review/Shift Note  Description: The Plan of Care Review/Shift note should be completed every shift.  The Outcome Evaluation is a brief statement about your assessment that the patient is improving, declining, or no change.  This information will be displayed automatically on your shift note.  Outcome: Adequate for Care Transition   Goal Outcome Evaluation:  Pt's pain is managed with oxycodone and tylenol . Pt ate jello and  drink apple juice. Pt reported having a bowel movement today. Discharge instructions explained to pt. Pt verbalized understanding. Pt discharged home.     Maggie Simms RN

## 2022-06-14 NOTE — PROGRESS NOTES
General Surgery Progress Note:    Hospital Day # 2    ASSESSMENT:   1. Acute appendicitis with localized peritonitis, without perforation, abscess, or gangrene        Chad M Johanson is a 41 year old male s/p appendectomy on 6/13/22. Patient with less pain and tenderness. WBC normalized. Drain serosanguinous.    PLAN:   Drain pulled today  Activity and diet advance as tolerated  Finish last dose of IV antibiotics  Patient ok to discharge from surgical standpoint  Pain medications sent to pharmacy of patient's choice      SUBJECTIVE:   Chad M Johanson is resting in bed. Denies n/v and has an appetite. He reports that his pain is better.    Patient Vitals for the past 24 hrs:   BP Temp Temp src Pulse Resp SpO2   06/14/22 0750 118/62 98.2  F (36.8  C) Oral 66 16 96 %   06/14/22 0020 102/50 98.1  F (36.7  C) Oral 69 16 94 %   06/13/22 1938 112/60 98.4  F (36.9  C) Oral 93 16 94 %   06/13/22 1600 119/73 98.1  F (36.7  C) Oral 63 18 97 %       Physical Exam:  General: NAD, pleasant  CV:RRR  LUNGS:CTA bilaterally  ABD: Soft, TTP in RLQ and LLQ. Drain was with serosanguinous output before removed  EXT:no CCE    Admission on 06/11/2022   Component Date Value     Sodium 06/11/2022 138      Potassium 06/11/2022 4.1      Chloride 06/11/2022 106      Carbon Dioxide (CO2) 06/11/2022 21 (A)     Anion Gap 06/11/2022 11      Urea Nitrogen 06/11/2022 19      Creatinine 06/11/2022 0.85      Calcium 06/11/2022 9.3      Glucose 06/11/2022 121      GFR Estimate 06/11/2022 >90      Bilirubin Total 06/11/2022 0.8      Bilirubin Direct 06/11/2022 0.2      Protein Total 06/11/2022 7.2      Albumin 06/11/2022 4.2      Alkaline Phosphatase 06/11/2022 98      AST 06/11/2022 30      ALT 06/11/2022 24      WBC Count 06/11/2022 15.7 (A)     RBC Count 06/11/2022 4.95      Hemoglobin 06/11/2022 15.2      Hematocrit 06/11/2022 42.9      MCV 06/11/2022 87      MCH 06/11/2022 30.7      MCHC 06/11/2022 35.4      RDW 06/11/2022 13.3      Platelet Count  06/11/2022 234      % Neutrophils 06/11/2022 85      % Lymphocytes 06/11/2022 10      % Monocytes 06/11/2022 5      % Eosinophils 06/11/2022 0      % Basophils 06/11/2022 0      % Immature Granulocytes 06/11/2022 0      NRBCs per 100 WBC 06/11/2022 0      Absolute Neutrophils 06/11/2022 13.4 (A)     Absolute Lymphocytes 06/11/2022 1.6      Absolute Monocytes 06/11/2022 0.7      Absolute Eosinophils 06/11/2022 0.0      Absolute Basophils 06/11/2022 0.1      Absolute Immature Granul* 06/11/2022 0.1      Absolute NRBCs 06/11/2022 0.0      SARS CoV2 PCR 06/11/2022 Negative      Platelet Count 06/12/2022 222      GLUCOSE BY METER POCT 06/13/2022 119 (A)     WBC Count 06/13/2022 12.5 (A)     RBC Count 06/13/2022 4.13 (A)     Hemoglobin 06/13/2022 12.9 (A)     Hematocrit 06/13/2022 36.6 (A)     MCV 06/13/2022 89      MCH 06/13/2022 31.2      MCHC 06/13/2022 35.2      RDW 06/13/2022 13.2      Platelet Count 06/13/2022 208      % Neutrophils 06/13/2022 80      % Lymphocytes 06/13/2022 11      % Monocytes 06/13/2022 8      % Eosinophils 06/13/2022 0      % Basophils 06/13/2022 0      % Immature Granulocytes 06/13/2022 1      NRBCs per 100 WBC 06/13/2022 0      Absolute Neutrophils 06/13/2022 10.2 (A)     Absolute Lymphocytes 06/13/2022 1.4      Absolute Monocytes 06/13/2022 1.0      Absolute Eosinophils 06/13/2022 0.0      Absolute Basophils 06/13/2022 0.0      Absolute Immature Granul* 06/13/2022 0.1      Absolute NRBCs 06/13/2022 0.0         ELICIA Garcia PA-C  [facility]  [address] [address]  Phone- [phone]  Fax- [phone]

## 2022-06-14 NOTE — DISCHARGE SUMMARY
Physician Discharge Summary     Patient ID:  Chad M Johanson  [id number]  41 year old  1980    Admit date: 6/11/2022    Discharge date and time: 6/14/2022     Admitting Physician: Rajendra Lennon MD     Discharge Physician: Rajendra Lennon    Admission Diagnoses: Acute appendicitis with localized peritonitis, without perforation, abscess, or gangrene [K35.30]    Discharge Diagnoses: Acute appendicitis     Admission Condition: fair    Discharged Condition: good    Indication for Admission: Acute appendicitis     Hospital Course: Patient admitted for acute appendicitis and underwent appendectomy with drain placement. Received IV antibiotics and WBC normalized. Drain removed and he is in good condition to discharge.     Consults: none    Significant Diagnostic Studies: labs: elevated WBC. CT showed appendicitis     Treatments: surgery: appendectomy. IV antibiotics     Discharge Exam:  GENERAL APPEARANCE: healthy, alert and no distress  EYES: Eyes grossly normal to inspection, PERRL and conjunctivae and sclerae normal  HENT: ear canals and TM's normal, nose and mouth without ulcers or lesions, oropharynx clear and oral mucous membranes moist  NECK: no adenopathy, no asymmetry, masses, or scars and thyroid normal to palpation  RESP: lungs clear to auscultation - no rales, rhonchi or wheezes  CV: regular rates and rhythm, normal S1 S2, no S3 or S4, no murmur, click or rub, no peripheral edema and peripheral pulses strong  ABDOMEN: soft, nontender, no hepatosplenomegaly, no masses and bowel sounds normal   (male): normal male genitalia without lesions or urethral discharge, no hernia  RECTAL: normal sphincter tone, no rectal masses, prostate of normal size, smooth, nontender without nodules or masses  MS: no musculoskeletal defects are noted and gait is age appropriate without ataxia  SKIN: no suspicious lesions or rashes  NEURO: Normal strength and tone, sensory exam grossly normal, mentation intact and speech  normal  PSYCH: mentation appears normal and affect normal/bright    Disposition: home    Patient Instructions:   Current Discharge Medication List      START taking these medications    Details   acetaminophen (TYLENOL) 325 MG tablet Take 2 tablets (650 mg) by mouth every 4 hours as needed for other (For optimal non-opioid multimodal pain management to improve pain control.)    Associated Diagnoses: Acute appendicitis with localized peritonitis, without perforation, abscess, or gangrene      oxyCODONE (ROXICODONE) 5 MG tablet Take 1 tablet (5 mg) by mouth every 6 hours as needed  Qty: 12 tablet, Refills: 0    Associated Diagnoses: Acute appendicitis with localized peritonitis, without perforation, abscess, or gangrene           Activity: activity as tolerated  Diet: regular diet  Wound Care: keep wound clean and dry    Follow-up with general surgery in 2 weeks.    Signed:  Rashida Tan PA-C   [facility]   [phone]  General Surgery

## 2022-06-14 NOTE — DISCHARGE INSTRUCTIONS
From your Surgeon:    Follow up:  For straightforward laparoscopic procedures, our practice is to check-in with you over the phone a few days after your procedure.  If you would like a scheduled follow up appointment in clinic, please call us at [phone] to schedule an appointment at your convenience.  If you would prefer to follow up with us further by phone, please let us know so that we may contact you 2-3 weeks following your procedure.        Diet: Regular diet. Patients can have difficulty with constipation following surgery, due in part to the administration of narcotic medications.  If you are suffering with constipation, you should avoid foods such as hard cheeses or red meat.  Foods high in fiber are recommended.      Activity: You should continue to be active at home, including ambulating frequently.  If possible try to limit the amount of time spent in bed.    Restrictions: You should not lift greater than 15 pounds for 2 weeks, and will want to avoid strenuous physical activity for 1-2 weeks.  You should limit your physical activity if it causes you discomfort; however, this should resolve within 1-2 weeks.   Walking does not count as strenuous physical activity.  You are safe to walk up and down stairs.  Following 2 weeks you may resume all normal physical activity.    Work:  You can return to work once your surgical pain has resolved.  If you perform duties that require lifting, pushing or pulling anything greater than 15 pounds, then you would have to be on light duty for the immediate 2 weeks after your surgery (if your work allows light duty).  After 2 weeks, you can return to work without restrictions.    Wound care: You may remove your Band-aids after a period of 24 hours.  The small white strips on the incisions act like artificial scabs, and will begin to peel at the edges at around 5-7 days.  These can then be removed.  It is normal to have a small rim of red present around the incisions.  This should not, however, extend beyond 1/4 inch from the incision.  If your incisions become increasingly tender, red, or draining, please contact us.       Bathing: You may shower after 24 hours from surgery.  It is ok to get your incisions wet, but avoid rubbing them.  Avoid soaking in bath tubs, or swimming in lakes, pools, or streams for 2 weeks following surgery.

## 2022-06-14 NOTE — PLAN OF CARE
C/o moderate pain on abdomen. Pain has been well controlled with scheduled and PRN medications.     Lap sites x3 CDI.  Drain site is CDI, draining serosanguenous output.

## 2022-06-16 DIAGNOSIS — K35.30 ACUTE APPENDICITIS WITH LOCALIZED PERITONITIS, WITHOUT PERFORATION, ABSCESS, OR GANGRENE: ICD-10-CM

## 2022-06-16 RX ORDER — OXYCODONE HYDROCHLORIDE 5 MG/1
5 TABLET ORAL EVERY 6 HOURS PRN
Qty: 12 TABLET | Refills: 0 | Status: SHIPPED | OUTPATIENT
Start: 2022-06-16 | End: 2022-10-28

## 2022-06-16 NOTE — TELEPHONE ENCOUNTER
Gulshan is calling with complaint of abdominal pain. He is s/p laparoscopic appendectomy on 6/12/22. Patient admitted for acute appendicitis and underwent appendectomy with drain placement. Received IV antibiotics and WBC normalized. Drain removed at [facility]ich[facility]ge on 6/14/22.     Spoke with Gulshan and he taking prescribed Oxycodone q 6 hours and alternating with Tylenol and Ibuprofen. This has been helpful during the day, but during the night he states his pain is worse and he cannot sleep through the night. Discussed timing oxycodone use 45 min before bed and taking #2 tablets.     He denies any fever or chills. His incisions are healing well with no concerns of infection. He has been having multiple loose bowel movements.     He is asking for a refill of Oxycodone to help for the next week. Informed him that I would speak with Dr. Lennon and call him back if this can be sent in for him.

## 2022-06-22 ENCOUNTER — TELEPHONE (OUTPATIENT)
Dept: SURGERY | Facility: CLINIC | Age: 42
End: 2022-06-22

## 2022-06-22 NOTE — TELEPHONE ENCOUNTER
Gulshan needs a return to work letter.  He would like it emailed to him.  Email on file is correct.    Thanks!

## 2022-06-22 NOTE — TELEPHONE ENCOUNTER
Spoke with Gulshan and we discussed documentation needed for his return to work. I will complete this and e-mail a copy back to him by tomorrow. He is doing well with no complaints after surgery.

## 2022-10-01 ENCOUNTER — HEALTH MAINTENANCE LETTER (OUTPATIENT)
Age: 42
End: 2022-10-01

## 2022-10-28 ENCOUNTER — VIRTUAL VISIT (OUTPATIENT)
Dept: FAMILY MEDICINE | Facility: CLINIC | Age: 42
End: 2022-10-28
Payer: COMMERCIAL

## 2022-10-28 DIAGNOSIS — N27.0 UNILATERAL SMALL KIDNEY: ICD-10-CM

## 2022-10-28 DIAGNOSIS — F41.9 ANXIETY: Primary | ICD-10-CM

## 2022-10-28 DIAGNOSIS — F33.0 MILD EPISODE OF RECURRENT MAJOR DEPRESSIVE DISORDER (H): ICD-10-CM

## 2022-10-28 PROBLEM — K35.30 ACUTE APPENDICITIS WITH LOCALIZED PERITONITIS, WITHOUT PERFORATION, ABSCESS, OR GANGRENE: Status: RESOLVED | Noted: 2022-06-11 | Resolved: 2022-10-28

## 2022-10-28 PROCEDURE — 99213 OFFICE O/P EST LOW 20 MIN: CPT | Mod: TEL | Performed by: FAMILY MEDICINE

## 2022-10-28 RX ORDER — SERTRALINE HYDROCHLORIDE 100 MG/1
100 TABLET, FILM COATED ORAL DAILY
Qty: 90 TABLET | Refills: 3 | Status: SHIPPED | OUTPATIENT
Start: 2022-10-28 | End: 2024-03-04

## 2022-10-28 NOTE — PROGRESS NOTES
Gulshan is a 41 year old who is being evaluated via a billable telephone visit.      What phone number would you like to be contacted at? [phone]   How would you like to obtain your AVS? MyChart    Assessment & Plan     Anxiety  Anxiety and depression symptoms have been improved since he has been taking the sertraline regularly.  He was given a refill and he is planning to schedule a physical exam with me in the next couple of months.  - sertraline (ZOLOFT) 100 MG tablet; Take 1 tablet (100 mg) by mouth daily    Mild episode of recurrent major depressive disorder (H)  Anxiety and depression symptoms have been improved since he has been taking the sertraline regularly.  He was given a refill and he is planning to schedule a physical exam with me in the next couple of months.      Unilateral small kidney  His kidney function was checked this past  and it was normal.                     Return in about 8 weeks (around 2022) for Annual Exam.    Carlos Brice, [facility]   Gulshan is a 41 year old, presenting for the following health issues:  Anxiety      HPI     Anxiety Follow-Up    How are you doing with your anxiety since your last visit? No change    Are you having other symptoms that might be associated with anxiety? Yes:  restarting them - states he goes off and on them when he gets edgy - feeling edgy now so wants to restart    Have you had a significant life event? No     Are you feeling depressed? No    Do you have any concerns with your use of alcohol or other drugs? No    Social History     Tobacco Use     Smoking status: Former     Types: Cigarettes     Quit date: 2013     Years since quittin.8     Smokeless tobacco: Never   Substance Use Topics     Alcohol use: No     Drug use: Yes     Types: Marijuana     No flowsheet data found.  PHQ 2020   PHQ-9 Total Score 0   Q9: Thoughts of better off dead/self-harm past 2 weeks Not at all     Last  PHQ-9 8/18/2020   1.  Little interest or pleasure in doing things 0   2.  Feeling down, depressed, or hopeless 0   3.  Trouble falling or staying asleep, or sleeping too much 0   4.  Feeling tired or having little energy 0   5.  Poor appetite or overeating 0   6.  Feeling bad about yourself 0   7.  Trouble concentrating 0   8.  Moving slowly or restless 0   Q9: Thoughts of better off dead/self-harm past 2 weeks 0   PHQ-9 Total Score 0     No flowsheet data found.      How many servings of fruits and vegetables do you eat daily?  4 or more    On average, how many sweetened beverages do you drink each day (Examples: soda, juice, sweet tea, etc.  Do NOT count diet or artificially sweetened beverages)?   2    How many days per week do you exercise enough to make your heart beat faster? 7    How many minutes a day do you exercise enough to make your heart beat faster? 60 or more    How many days per week do you miss taking your medication? NA    Health Maintenance Addressed:  Flu Shot - will get         Review of Systems   Constitutional, HEENT, cardiovascular, pulmonary, GI, , musculoskeletal, neuro, skin, endocrine and psych systems are negative, except as otherwise noted.      Objective           Vitals:  No vitals were obtained today due to virtual visit.    Physical Exam   healthy, alert and no distress  PSYCH: Alert and oriented times 3; coherent speech, normal   rate and volume, able to articulate logical thoughts, able   to abstract reason, no tangential thoughts, no hallucinations   or delusions  His affect is normal  RESP: No cough, no audible wheezing, able to talk in full sentences  Remainder of exam unable to be completed due to telephone visits                Phone call duration: 7 minutes

## 2023-10-15 ENCOUNTER — HEALTH MAINTENANCE LETTER (OUTPATIENT)
Age: 43
End: 2023-10-15

## 2024-02-15 NOTE — TELEPHONE ENCOUNTER
I spoke to Gulshan and recommended he see dermatology.  I explained that I am not aware of using oral metronidazole to help with lichen planus.  He is planning to set up an appointment at [facility] dermatology.  
Medication Request  Medication name: Metronidazole 500mg tablet, taking one tablet two times a day   Pharmacy Name and Location: [id number]  Reason for request: Patient does not need the metronidazole cream.  He wants the tablets.  Tablets is what he had received from his dermatologist in the past for his skin condition.  When did you use medication last?:  Has not had pills for a while  Patient offered appointment:  Patient was seen yesterday  Okay to leave a detailed message: yes    
No

## 2024-03-03 SDOH — HEALTH STABILITY: PHYSICAL HEALTH: ON AVERAGE, HOW MANY MINUTES DO YOU ENGAGE IN EXERCISE AT THIS LEVEL?: 60 MIN

## 2024-03-03 SDOH — HEALTH STABILITY: PHYSICAL HEALTH: ON AVERAGE, HOW MANY DAYS PER WEEK DO YOU ENGAGE IN MODERATE TO STRENUOUS EXERCISE (LIKE A BRISK WALK)?: 7 DAYS

## 2024-03-03 ASSESSMENT — PATIENT HEALTH QUESTIONNAIRE - PHQ9
SUM OF ALL RESPONSES TO PHQ QUESTIONS 1-9: 0
10. IF YOU CHECKED OFF ANY PROBLEMS, HOW DIFFICULT HAVE THESE PROBLEMS MADE IT FOR YOU TO DO YOUR WORK, TAKE CARE OF THINGS AT HOME, OR GET ALONG WITH OTHER PEOPLE: NOT DIFFICULT AT ALL
SUM OF ALL RESPONSES TO PHQ QUESTIONS 1-9: 0

## 2024-03-03 ASSESSMENT — SOCIAL DETERMINANTS OF HEALTH (SDOH): HOW OFTEN DO YOU GET TOGETHER WITH FRIENDS OR RELATIVES?: TWICE A WEEK

## 2024-03-04 ENCOUNTER — OFFICE VISIT (OUTPATIENT)
Dept: FAMILY MEDICINE | Facility: CLINIC | Age: 44
End: 2024-03-04
Payer: COMMERCIAL

## 2024-03-04 VITALS
TEMPERATURE: 98 F | BODY MASS INDEX: 28.6 KG/M2 | HEIGHT: 75 IN | SYSTOLIC BLOOD PRESSURE: 135 MMHG | WEIGHT: 230 LBS | OXYGEN SATURATION: 97 % | RESPIRATION RATE: 16 BRPM | HEART RATE: 67 BPM | DIASTOLIC BLOOD PRESSURE: 80 MMHG

## 2024-03-04 DIAGNOSIS — Z00.00 WELL ADULT ON ROUTINE HEALTH CHECK: Primary | ICD-10-CM

## 2024-03-04 DIAGNOSIS — Z12.11 SCREEN FOR COLON CANCER: ICD-10-CM

## 2024-03-04 DIAGNOSIS — L43.9 LICHEN PLANUS: ICD-10-CM

## 2024-03-04 DIAGNOSIS — Z13.220 SCREENING CHOLESTEROL LEVEL: ICD-10-CM

## 2024-03-04 DIAGNOSIS — F41.9 ANXIETY: ICD-10-CM

## 2024-03-04 DIAGNOSIS — F33.0 MILD EPISODE OF RECURRENT MAJOR DEPRESSIVE DISORDER (H): ICD-10-CM

## 2024-03-04 DIAGNOSIS — Z11.4 SCREENING FOR HIV (HUMAN IMMUNODEFICIENCY VIRUS): ICD-10-CM

## 2024-03-04 DIAGNOSIS — R73.01 IMPAIRED FASTING GLUCOSE: ICD-10-CM

## 2024-03-04 DIAGNOSIS — N52.9 ERECTILE DYSFUNCTION, UNSPECIFIED ERECTILE DYSFUNCTION TYPE: ICD-10-CM

## 2024-03-04 DIAGNOSIS — R51.9 LEFT TEMPORAL HEADACHE: ICD-10-CM

## 2024-03-04 DIAGNOSIS — Z12.5 SCREENING FOR PROSTATE CANCER: ICD-10-CM

## 2024-03-04 DIAGNOSIS — Z13.1 SCREENING FOR DIABETES MELLITUS: ICD-10-CM

## 2024-03-04 LAB
ALBUMIN SERPL BCG-MCNC: 4.2 G/DL (ref 3.5–5.2)
ALP SERPL-CCNC: 86 U/L (ref 40–150)
ALT SERPL W P-5'-P-CCNC: 15 U/L (ref 0–70)
ANION GAP SERPL CALCULATED.3IONS-SCNC: 9 MMOL/L (ref 7–15)
AST SERPL W P-5'-P-CCNC: 23 U/L (ref 0–45)
BILIRUB SERPL-MCNC: 0.4 MG/DL
BUN SERPL-MCNC: 19.8 MG/DL (ref 6–20)
CALCIUM SERPL-MCNC: 8.9 MG/DL (ref 8.6–10)
CHLORIDE SERPL-SCNC: 109 MMOL/L (ref 98–107)
CHOLEST SERPL-MCNC: 181 MG/DL
CREAT SERPL-MCNC: 0.92 MG/DL (ref 0.67–1.17)
DEPRECATED HCO3 PLAS-SCNC: 25 MMOL/L (ref 22–29)
EGFRCR SERPLBLD CKD-EPI 2021: >90 ML/MIN/1.73M2
ERYTHROCYTE [DISTWIDTH] IN BLOOD BY AUTOMATED COUNT: 12.8 % (ref 10–15)
ERYTHROCYTE [SEDIMENTATION RATE] IN BLOOD BY WESTERGREN METHOD: 8 MM/HR (ref 0–15)
FASTING STATUS PATIENT QL REPORTED: YES
GLUCOSE SERPL-MCNC: 109 MG/DL (ref 70–99)
HBA1C MFR BLD: 5.3 % (ref 0–5.6)
HCT VFR BLD AUTO: 43.4 % (ref 40–53)
HDLC SERPL-MCNC: 57 MG/DL
HGB BLD-MCNC: 14.5 G/DL (ref 13.3–17.7)
HIV 1+2 AB+HIV1 P24 AG SERPL QL IA: NONREACTIVE
LDLC SERPL CALC-MCNC: 109 MG/DL
MCH RBC QN AUTO: 29.7 PG (ref 26.5–33)
MCHC RBC AUTO-ENTMCNC: 33.4 G/DL (ref 31.5–36.5)
MCV RBC AUTO: 89 FL (ref 78–100)
NONHDLC SERPL-MCNC: 124 MG/DL
PLATELET # BLD AUTO: 238 10E3/UL (ref 150–450)
POTASSIUM SERPL-SCNC: 4.5 MMOL/L (ref 3.4–5.3)
PROT SERPL-MCNC: 6.3 G/DL (ref 6.4–8.3)
PSA SERPL DL<=0.01 NG/ML-MCNC: 0.36 NG/ML (ref 0–2.5)
RBC # BLD AUTO: 4.89 10E6/UL (ref 4.4–5.9)
SODIUM SERPL-SCNC: 143 MMOL/L (ref 135–145)
TRIGL SERPL-MCNC: 73 MG/DL
WBC # BLD AUTO: 5.9 10E3/UL (ref 4–11)

## 2024-03-04 PROCEDURE — 90686 IIV4 VACC NO PRSV 0.5 ML IM: CPT | Performed by: FAMILY MEDICINE

## 2024-03-04 PROCEDURE — 91320 SARSCV2 VAC 30MCG TRS-SUC IM: CPT | Performed by: FAMILY MEDICINE

## 2024-03-04 PROCEDURE — G0103 PSA SCREENING: HCPCS | Performed by: FAMILY MEDICINE

## 2024-03-04 PROCEDURE — 36415 COLL VENOUS BLD VENIPUNCTURE: CPT | Performed by: FAMILY MEDICINE

## 2024-03-04 PROCEDURE — 85652 RBC SED RATE AUTOMATED: CPT | Performed by: FAMILY MEDICINE

## 2024-03-04 PROCEDURE — 85027 COMPLETE CBC AUTOMATED: CPT | Performed by: FAMILY MEDICINE

## 2024-03-04 PROCEDURE — 99213 OFFICE O/P EST LOW 20 MIN: CPT | Mod: 25 | Performed by: FAMILY MEDICINE

## 2024-03-04 PROCEDURE — 90471 IMMUNIZATION ADMIN: CPT | Performed by: FAMILY MEDICINE

## 2024-03-04 PROCEDURE — 83036 HEMOGLOBIN GLYCOSYLATED A1C: CPT | Performed by: FAMILY MEDICINE

## 2024-03-04 PROCEDURE — 80053 COMPREHEN METABOLIC PANEL: CPT | Performed by: FAMILY MEDICINE

## 2024-03-04 PROCEDURE — 90715 TDAP VACCINE 7 YRS/> IM: CPT | Performed by: FAMILY MEDICINE

## 2024-03-04 PROCEDURE — 99396 PREV VISIT EST AGE 40-64: CPT | Mod: 25 | Performed by: FAMILY MEDICINE

## 2024-03-04 PROCEDURE — 80061 LIPID PANEL: CPT | Performed by: FAMILY MEDICINE

## 2024-03-04 PROCEDURE — 90472 IMMUNIZATION ADMIN EACH ADD: CPT | Performed by: FAMILY MEDICINE

## 2024-03-04 PROCEDURE — 90480 ADMN SARSCOV2 VAC 1/ONLY CMP: CPT | Performed by: FAMILY MEDICINE

## 2024-03-04 PROCEDURE — 87389 HIV-1 AG W/HIV-1&-2 AB AG IA: CPT | Performed by: FAMILY MEDICINE

## 2024-03-04 RX ORDER — SILDENAFIL 100 MG/1
50-100 TABLET, FILM COATED ORAL DAILY PRN
Qty: 30 TABLET | Refills: 3 | Status: SHIPPED | OUTPATIENT
Start: 2024-03-04

## 2024-03-04 ASSESSMENT — PAIN SCALES - GENERAL: PAINLEVEL: NO PAIN (0)

## 2024-03-04 NOTE — PROGRESS NOTES
Preventive Care Visit  [facility]  Carlos Brice DO, Family Medicine  Mar 4, 2024    Assessment & Plan     Well adult on routine health check  I encouraged him to eat a healthy diet and get regular exercise.  We are going to check fasting labs as listed below.    Left temporal headache  I recommended including a sed rate and CBC as part of today's lab results to further evaluate the left temporal headaches he has had over the past 3 months.  - ESR: Erythrocyte sedimentation rate; Future  - CBC with platelets; Future  - CBC with platelets  - ESR: Erythrocyte sedimentation rate    Anxiety  He feels like mental health symptoms have been stable off sertraline.    Mild episode of recurrent major depressive disorder (H24)  He feels like mental health symptoms have been stable off sertraline.    Lichen planus  He has topical steroid treatments available through dermatology at home that he uses as needed.    Erectile dysfunction, unspecified erectile dysfunction type  He was given a refill of Viagra that has worked well for him in the past.  - sildenafil (VIAGRA) 100 MG tablet; Take 0.5-1 tablets ( mg) by mouth daily as needed    Screening for HIV (human immunodeficiency virus)  - HIV Antigen Antibody Combo; Future  - HIV Antigen Antibody Combo    Screen for colon cancer  - Colonoscopy Screening  Referral; Future    Screening for prostate cancer  - PSA, screen; Future  - PSA, screen    Screening cholesterol level  - Lipid panel reflex to direct LDL Fasting; Future  - Lipid panel reflex to direct LDL Fasting    Screening for diabetes mellitus  - Comprehensive metabolic panel (BMP + Alb, Alk Phos, ALT, AST, Total. Bili, TP); Future  - Comprehensive metabolic panel (BMP + Alb, Alk Phos, ALT, AST, Total. Bili, TP)    Patient has been advised of split billing requirements and indicates understanding: Yes          BMI  Estimated body mass index is 28.75 kg/m  as calculated from the  "following:    Height as of this encounter: 1.905 m (6' 3\").    Weight as of this encounter: 104.3 kg (230 lb).   Weight management plan: Discussed healthy diet and exercise guidelines    Counseling  Appropriate preventive services were discussed with this patient, including applicable screening as appropriate for fall prevention, nutrition, physical activity, Tobacco-use cessation, weight loss and cognition.  Checklist reviewing preventive services available has been given to the patient.  Reviewed patient's diet, addressing concerns and/or questions.   He is at risk for psychosocial distress and has been provided with information to reduce risk.           Janneth Gaffney is a 43 year old, presenting for the following:  Physical        3/4/2024     6:56 AM   Additional Questions   Roomed by bi [employer]    He has a medical history significant for anxiety, depression, erectile dysfunction and lichen planus.  He feels like there is some room for improvement in these areas, but overall things are going pretty well.  He stopped taking sertraline this past year because he did not want to be on medication for anxiety or depression anymore.  He reports struggling with opioid abuse in the past and in 2018 started Suboxone on his own.  He ended up stopping this in the past year which was difficult, but he has been able to remain off of it consistently.    Social history: , 2 children              3/3/2024   General Health   How would you rate your overall physical health? Good   Feel stress (tense, anxious, or unable to sleep) Only a little   (!) STRESS CONCERN      3/3/2024   Nutrition   Three or more servings of calcium each day? Yes   Diet: Regular (no restrictions)   How many servings of fruit and vegetables per day? (!) 2-3   How many sweetened beverages each day? (!) 2         3/3/2024   Exercise   Days per week of moderate/strenous exercise 7 days   Average minutes spent exercising at this level 60 " "min         3/3/2024   Social Factors   Frequency of gathering with friends or relatives Twice a week   Worry food won't last until get money to buy more No   Food not last or not have enough money for food? No   Do you have housing?  Yes   Are you worried about losing your housing? No   Lack of transportation? No   Unable to get utilities (heat,electricity)? No         3/3/2024   Dental   Dentist two times every year? Yes         3/3/2024   TB Screening   Were you born outside of [place]?  No       Today's PHQ-9 Score:       3/3/2024     8:58 AM   PHQ-9 SCORE   PHQ-9 Total Score MyChart 0   PHQ-9 Total Score 0         3/3/2024   Substance Use   Alcohol more than 3/day or more than 7/wk No   Do you use any other substances recreationally? (!) CANNABIS PRODUCTS     Social History     Tobacco Use    Smoking status: Former     Types: Cigarettes     Quit date: 2013     Years since quittin.1    Smokeless tobacco: Never   Substance Use Topics    Alcohol use: No    Drug use: Yes     Types: Marijuana             3/3/2024   One time HIV Screening   Previous HIV test? No         3/3/2024   STI Screening   New sexual partner(s) since last STI/HIV test? No   ASCVD Risk   The ASCVD Risk score (Brian DK, et al., 2019) failed to calculate for the following reasons:    Cannot find a previous HDL lab    Cannot find a previous total cholesterol lab        3/3/2024   Contraception/Family Planning   Questions about contraception or family planning No        Reviewed and updated as needed this visit by Provider                          Review of Systems  Constitutional, HEENT, cardiovascular, pulmonary, GI, , musculoskeletal, neuro, skin, endocrine and psych systems are negative, except as otherwise noted.     Objective    Exam  /80   Pulse 67   Temp 98  F (36.7  C) (Temporal)   Resp 16   Ht 1.905 m (6' 3\")   Wt 104.3 kg (230 lb)   SpO2 97%   BMI 28.75 kg/m     Estimated body mass index is 28.75 kg/m  as " "calculated from the following:    Height as of this encounter: 1.905 m (6' 3\").    Weight as of this encounter: 104.3 kg (230 lb).    Physical Exam  GENERAL: alert and no distress  EYES: Eyes grossly normal to inspection, PERRL and conjunctivae and sclerae normal  HENT: ear canals and TM's normal, nose and mouth without ulcers or lesions  NECK: no adenopathy, no asymmetry, masses, or scars  RESP: lungs clear to auscultation - no rales, rhonchi or wheezes  CV: regular rate and rhythm, normal S1 S2, no S3 or S4, no murmur, click or rub, no peripheral edema  ABDOMEN: soft, nontender, no hepatosplenomegaly, no masses and bowel sounds normal  MS: no gross musculoskeletal defects noted, no edema  SKIN: no suspicious lesions or rashes  NEURO: Normal strength and tone, mentation intact and speech normal  PSYCH: mentation appears normal, affect normal/bright      Signed Electronically by: Carlos Brice,     "

## 2024-04-24 ENCOUNTER — NURSE TRIAGE (OUTPATIENT)
Dept: FAMILY MEDICINE | Facility: CLINIC | Age: 44
End: 2024-04-24
Payer: COMMERCIAL

## 2024-04-24 NOTE — TELEPHONE ENCOUNTER
Reason for call:  Other   Patient called regarding (reason for call): call back  Additional comments: patient hurt his knee and thinks an MRI is needed, could you order that and then he can follow up    Phone number to reach patient:  [phone] or [phone]     Best Time:  any    Can we leave a detailed message on this number?  YES    Travel screening: Not Applicable

## 2024-04-24 NOTE — TELEPHONE ENCOUNTER
Spoke to patient  Reports injuring L knee on trampoline this weekend  States the pain is okay in the morning, but increases by the evening with walking and use  Pain rated 7/10 by evening  Ibuprofen decreases pain   Works as an  and would like addressed as soon as possible  Offered to schedule with PCP tomorrow  States he prefers to skip to an orthopedic provider to reduce cost/appointments  Reviewed [facility] orthopedic walk in clinic option   Patient plans to go there tomorrow morning  Will call back if new or worsening symptoms   Molly Asher RN      Reason for Disposition   A 'snap' or 'pop' was heard at the time of injury    Additional Information   Negative: Major bleeding (actively dripping or spurting) that can't be stopped   Negative: Bullet, stabbed by knife or other serious penetrating wound   Negative: Looks like a dislocated joint (crooked or deformed)   Negative: Serious injury with multiple fractures (broken bones)   Negative: Sounds like a life-threatening emergency to the triager   Negative: Wound looks infected   Negative: Knee pain from overuse (e.g., sports, running, physical work)   Negative: Knee pain not from an injury   Negative: Can't stand (bear weight) or walk   Negative: Skin is split open or gaping (length > 1/2 inch or 12 mm)   Negative: Bleeding won't stop after 10 minutes of direct pressure (using correct technique)   Negative: Dirt in the wound and not removed after 15 minutes of scrubbing   Negative: Sounds like a serious injury to the triager   Negative: Looks infected (e.g., spreading redness, red streak, pus)   Negative: SEVERE pain (e.g., excruciating)   Negative: No prior tetanus shots (or is not fully vaccinated) and any wound (e.g., cut or scrape)   Negative: HIV positive or severe immunodeficiency (severely weak immune system) and DIRTY cut or scrape   Negative: Patient wants to be seen   Negative: Injury interferes with work or school    Protocols  used: Knee Injury-A-OH

## 2024-04-25 ENCOUNTER — ANCILLARY PROCEDURE (OUTPATIENT)
Dept: GENERAL RADIOLOGY | Facility: CLINIC | Age: 44
End: 2024-04-25
Attending: PREVENTIVE MEDICINE
Payer: COMMERCIAL

## 2024-04-25 ENCOUNTER — OFFICE VISIT (OUTPATIENT)
Dept: ORTHOPEDICS | Facility: CLINIC | Age: 44
End: 2024-04-25
Payer: COMMERCIAL

## 2024-04-25 DIAGNOSIS — M25.561 BILATERAL KNEE PAIN: Primary | ICD-10-CM

## 2024-04-25 DIAGNOSIS — M25.561 BILATERAL KNEE PAIN: ICD-10-CM

## 2024-04-25 DIAGNOSIS — S89.92XA LEFT KNEE INJURY, INITIAL ENCOUNTER: Primary | ICD-10-CM

## 2024-04-25 DIAGNOSIS — M25.562 BILATERAL KNEE PAIN: Primary | ICD-10-CM

## 2024-04-25 DIAGNOSIS — M25.562 BILATERAL KNEE PAIN: ICD-10-CM

## 2024-04-25 DIAGNOSIS — S83.002A PATELLAR SUBLUXATION, LEFT, INITIAL ENCOUNTER: ICD-10-CM

## 2024-04-25 DIAGNOSIS — M25.562 PAIN OF LEFT PATELLOFEMORAL JOINT: ICD-10-CM

## 2024-04-25 PROCEDURE — 73562 X-RAY EXAM OF KNEE 3: CPT | Mod: LT | Performed by: RADIOLOGY

## 2024-04-25 PROCEDURE — 99214 OFFICE O/P EST MOD 30 MIN: CPT | Mod: 25 | Performed by: PREVENTIVE MEDICINE

## 2024-04-25 PROCEDURE — 20610 DRAIN/INJ JOINT/BURSA W/O US: CPT | Mod: LT | Performed by: PREVENTIVE MEDICINE

## 2024-04-25 RX ORDER — LIDOCAINE HYDROCHLORIDE 10 MG/ML
3 INJECTION, SOLUTION EPIDURAL; INFILTRATION; INTRACAUDAL; PERINEURAL
Status: SHIPPED | OUTPATIENT
Start: 2024-04-25

## 2024-04-25 RX ORDER — MELOXICAM 15 MG/1
15 TABLET ORAL DAILY PRN
Qty: 30 TABLET | Refills: 0 | Status: SHIPPED | OUTPATIENT
Start: 2024-04-25

## 2024-04-25 RX ORDER — TRIAMCINOLONE ACETONIDE 40 MG/ML
40 INJECTION, SUSPENSION INTRA-ARTICULAR; INTRAMUSCULAR
Status: SHIPPED | OUTPATIENT
Start: 2024-04-25

## 2024-04-25 RX ADMIN — LIDOCAINE HYDROCHLORIDE 3 ML: 10 INJECTION, SOLUTION EPIDURAL; INFILTRATION; INTRACAUDAL; PERINEURAL at 17:16

## 2024-04-25 RX ADMIN — TRIAMCINOLONE ACETONIDE 40 MG: 40 INJECTION, SUSPENSION INTRA-ARTICULAR; INTRAMUSCULAR at 17:16

## 2024-04-25 NOTE — PROGRESS NOTES
HISTORY OF PRESENT ILLNESS  Mr. Johanson is a pleasant 43 year old year old male who presents to clinic today with the following:    What problem are you here for: Left acute knee pain. He reports decreased ROM, decreased strength and feeling of instability.     How long have you had this problem: 6 days    Have you had any recent imaging of this problem? Xrays/MRI/CT scans:  - XR of bilateral knees taken at appointment today.     Have you had treatments for this problem in the past?  -Medications: Ibuprofen daily  -Physical therapy: None   -Injections: None  -Surgery: None   - Applying ice packs.  - OTC knee brace    How severe is this problem today? 0-10 scale: 4/10    How severe has this problem been at WORST in the past? 0-10 scale: 7-8/10 at the end of the day.     What do you think caused this problem: jumping on trampoline.     Does this problem or its symptoms cause difficulty for you falling asleep or staying asleep: No     Anything else you want us to know about this problem:  Patient works as . He has two children.           MEDICAL HISTORY  Patient Active Problem List   Diagnosis    Proteinuria    Unilateral small kidney    Mild episode of recurrent major depressive disorder (H24)       Current Outpatient Medications   Medication Sig Dispense Refill    sildenafil (VIAGRA) 100 MG tablet Take 0.5-1 tablets ( mg) by mouth daily as needed 30 tablet 3       No Known Allergies    Family History   Problem Relation Age of Onset    Diabetes Father     Cerebrovascular Disease Paternal Uncle     Cerebrovascular Disease Paternal Grandfather     Anxiety Disorder Mother      Social History     Socioeconomic History    Marital status:    Tobacco Use    Smoking status: Former     Current packs/day: 0.00     Types: Cigarettes     Quit date: 2013     Years since quittin.3    Smokeless tobacco: Never   Substance and Sexual Activity    Alcohol use: No    Drug use: Yes     Types: Marijuana      Social Determinants of Health     Financial Resource Strain: Low Risk  (3/3/2024)    Financial Resource Strain     Within the past 12 months, have you or your family members you live with been unable to get utilities (heat, electricity) when it was really needed?: No   Food Insecurity: Low Risk  (3/3/2024)    Food Insecurity     Within the past 12 months, did you worry that your food would run out before you got money to buy more?: No     Within the past 12 months, did the food you bought just not last and you didn t have money to get more?: No   Transportation Needs: Low Risk  (3/3/2024)    Transportation Needs     Within the past 12 months, has lack of transportation kept you from medical appointments, getting your medicines, non-medical meetings or appointments, work, or from getting things that you need?: No   Physical Activity: Sufficiently Active (3/3/2024)    Exercise Vital Sign     Days of Exercise per Week: 7 days     Minutes of Exercise per Session: 60 min   Stress: No Stress Concern Present (3/3/2024)    [employer] [place] [employer] - Occupational Stress Questionnaire     Feeling of Stress : Only a little   Social Connections: Unknown (3/3/2024)    Social Connection and Isolation Panel [NHANES]     Frequency of Social Gatherings with Friends and Family: Twice a week   Interpersonal Safety: Low Risk  (3/4/2024)    Interpersonal Safety     Do you feel physically and emotionally safe where you currently live?: Yes     Within the past 12 months, have you been hit, slapped, kicked or otherwise physically hurt by someone?: No     Within the past 12 months, have you been humiliated or emotionally abused in other ways by your partner or ex-partner?: No   Housing Stability: Low Risk  (3/3/2024)    Housing Stability     Do you have housing? : Yes     Are you worried about losing your housing?: No       Additional medical/Social/Surgical histories reviewed in EPIC and updated as appropriate.      REVIEW OF SYSTEMS (4/25/2024)  10 point ROS of systems including Constitutional, Eyes, Respiratory, Cardiovascular, Gastroenterology, Genitourinary, Integumentary, Musculoskeletal, Psychiatric, Allergic/Immunologic were all negative except for pertinent positives noted in my HPI.     PHYSICAL EXAM  VSS    General  - normal appearance, in no obvious distress  HEENT  - conjunctivae not injected, moist mucous membranes, normocephalic/atraumatic head, ears normal appearance, no lesions, mouth normal appearance, no scars, normal dentition and teeth present  CV  - normal popliteal pulse  Pulm  - normal respiratory pattern, non-labored  Musculoskeletal - left knee  - stance: normal gait without limp, no obvious leg length discrepancy, single-leg squat exhibits knee valgus, internal rotation of the hip, contralateral hip drop  - inspection: no swelling or effusion, normal muscle tone, normal bone and joint alignment, no obvious deformity  - palpation: no joint line tenderness, patellar tendon non-tender, tender medial patellar facet  - ROM: 135 degrees flexion, -5 degrees extension, not painful, crepitus with weight-bearing flexion  - strength: 5/5 in flexion, 5/5 in extension  - neuro: no sensory or motor deficit  - special tests:  (-) Lachman  (-) anterior drawer  (-) Hay  (-) Thessaly  (-) varus at 0 and 30 degrees flexion  (-) valgus at 0 and 30 degrees flexion  (+) Richie s compression test  (+) patellar grind  (-) patellar apprehension  Neuro  - no sensory or motor deficit, grossly normal coordination, normal muscle tone  Skin  - no ecchymosis, erythema, warmth, or induration, no obvious rash  Psych  - interactive, appropriate, normal mood and affect   ASSESSMENT & PLAN  44 yo male with left knee patellofemoral pain, contusion, arthritis, recent subluxation    I independently reviewed the following imaging studies:  Knee xrays: shows some mild arthritis in PF joint  After a 20 minute discussion and examination,  we decided to perform a same day injection for diagnostic and therapeutic purposes for  Left knee  Given RX for mobic PRN  Given HEP  Followup PRN      Appropriate PPE was utilized for prevention of spread of Covid-19.  Carlos Harmon MD, CAM    PROCEDURE:       left      Knee joint injection   The patient was apprised of the risks and the benefits of the procedure and consented and a written consent was signed.   The patient s  KNEE was sterilely prepped with chloraprep.   40 mg of triamcinolone suspension was drawn up into a 5 mL syringe with 4 mL of 1% lidocaine  The patient was injected into the knee with a 1.5-inch 22-gauge needle at the_superiorlateral aspect of their knee joint  There were no complications. The patient tolerated the procedure well. There was minimal bleeding.   The patient was instructed to ice the knee upon leaving clinic and refrain from overuse over the next 3 days.   The patient was instructed to go to the emergency room with any usual pain, swelling, or redness occurred in the injected area.   The patient was given a followup appointment to evaluate response to the injection to their increased range of motion and reduction of pain.  Follow up PRN  Dr Carlos Harmon     Large Joint Injection: L knee joint    Date/Time: 4/25/2024 5:16 PM    Performed by: Carlos Harmon MD  Authorized by: Carlos Harmon MD    Indications:  Pain and osteoarthritis  Needle Size:  22 G  Guidance: ultrasound    Approach:  Superolateral  Location:  Knee      Medications:  40 mg triamcinolone 40 MG/ML; 3 mL lidocaine (PF) 1 %  Outcome:  Tolerated well, no immediate complications  Procedure discussed: discussed risks, benefits, and alternatives    Consent Given by:  Patient  Timeout: timeout called immediately prior to procedure    Prep: patient was prepped and draped in usual sterile fashion

## 2024-04-25 NOTE — LETTER
4/25/2024      RE: Chad M Johanson  [address] [facility] [id number]     Dear Colleague,    Thank you for referring your patient, Chad M Johanson, to the [facility] [place]. Please see a copy of my visit note below.    HISTORY OF PRESENT ILLNESS  Mr. Johanson is a pleasant 43 year old year old male who presents to clinic today with the following:    What problem are you here for: Left acute knee pain. He reports decreased ROM, decreased strength and feeling of instability.     How long have you had this problem: 6 days    Have you had any recent imaging of this problem? Xrays/MRI/CT scans:  - XR of bilateral knees taken at appointment today.     Have you had treatments for this problem in the past?  -Medications: Ibuprofen daily  -Physical therapy: None   -Injections: None  -Surgery: None   - Applying ice packs.  - OTC knee brace    How severe is this problem today? 0-10 scale: 4/10    How severe has this problem been at WORST in the past? 0-10 scale: 7-8/10 at the end of the day.     What do you think caused this problem: jumping on trampoline.     Does this problem or its symptoms cause difficulty for you falling asleep or staying asleep: No     Anything else you want us to know about this problem:  Patient works as . He has two children.           MEDICAL HISTORY  Patient Active Problem List   Diagnosis     Proteinuria     Unilateral small kidney     Mild episode of recurrent major depressive disorder (H24)       Current Outpatient Medications   Medication Sig Dispense Refill     sildenafil (VIAGRA) 100 MG tablet Take 0.5-1 tablets ( mg) by mouth daily as needed 30 tablet 3       No Known Allergies    Family History   Problem Relation Age of Onset     Diabetes Father      Cerebrovascular Disease Paternal Uncle      Cerebrovascular Disease Paternal Grandfather      Anxiety Disorder Mother      Social History     Socioeconomic History     Marital status:     Tobacco Use     Smoking status: Former     Current packs/day: 0.00     Types: Cigarettes     Quit date: 2013     Years since quittin.3     Smokeless tobacco: Never   Substance and Sexual Activity     Alcohol use: No     Drug use: Yes     Types: Marijuana     Social Determinants of Health     Financial Resource Strain: Low Risk  (3/3/2024)    Financial Resource Strain      Within the past 12 months, have you or your family members you live with been unable to get utilities (heat, electricity) when it was really needed?: No   Food Insecurity: Low Risk  (3/3/2024)    Food Insecurity      Within the past 12 months, did you worry that your food would run out before you got money to buy more?: No      Within the past 12 months, did the food you bought just not last and you didn t have money to get more?: No   Transportation Needs: Low Risk  (3/3/2024)    Transportation Needs      Within the past 12 months, has lack of transportation kept you from medical appointments, getting your medicines, non-medical meetings or appointments, work, or from getting things that you need?: No   Physical Activity: Sufficiently Active (3/3/2024)    Exercise Vital Sign      Days of Exercise per Week: 7 days      Minutes of Exercise per Session: 60 min   Stress: No Stress Concern Present (3/3/2024)    [employer] [place] [employer] - Occupational Stress Questionnaire      Feeling of Stress : Only a little   Social Connections: Unknown (3/3/2024)    Social Connection and Isolation Panel [NHANES]      Frequency of Social Gatherings with Friends and Family: Twice a week   Interpersonal Safety: Low Risk  (3/4/2024)    Interpersonal Safety      Do you feel physically and emotionally safe where you currently live?: Yes      Within the past 12 months, have you been hit, slapped, kicked or otherwise physically hurt by someone?: No      Within the past 12 months, have you been humiliated or emotionally abused in other ways  by your partner or ex-partner?: No   Housing Stability: Low Risk  (3/3/2024)    Housing Stability      Do you have housing? : Yes      Are you worried about losing your housing?: No       Additional medical/Social/Surgical histories reviewed in [facility] and updated as appropriate.     REVIEW OF SYSTEMS (4/25/2024)  10 point ROS of systems including Constitutional, Eyes, Respiratory, Cardiovascular, Gastroenterology, Genitourinary, Integumentary, Musculoskeletal, Psychiatric, Allergic/Immunologic were all negative except for pertinent positives noted in my HPI.     PHYSICAL EXAM  VSS    General  - normal appearance, in no obvious distress  HEENT  - conjunctivae not injected, moist mucous membranes, normocephalic/atraumatic head, ears normal appearance, no lesions, mouth normal appearance, no scars, normal dentition and teeth present  CV  - normal popliteal pulse  Pulm  - normal respiratory pattern, non-labored  Musculoskeletal - left knee  - stance: normal gait without limp, no obvious leg length discrepancy, single-leg squat exhibits knee valgus, internal rotation of the hip, contralateral hip drop  - inspection: no swelling or effusion, normal muscle tone, normal bone and joint alignment, no obvious deformity  - palpation: no joint line tenderness, patellar tendon non-tender, tender medial patellar facet  - ROM: 135 degrees flexion, -5 degrees extension, not painful, crepitus with weight-bearing flexion  - strength: 5/5 in flexion, 5/5 in extension  - neuro: no sensory or motor deficit  - special tests:  (-) Lachman  (-) anterior drawer  (-) Hay  (-) Thessaly  (-) varus at 0 and 30 degrees flexion  (-) valgus at 0 and 30 degrees flexion  (+) Richie s compression test  (+) patellar grind  (-) patellar apprehension  Neuro  - no sensory or motor deficit, grossly normal coordination, normal muscle tone  Skin  - no ecchymosis, erythema, warmth, or induration, no obvious rash  Psych  - interactive, appropriate, normal  mood and affect   ASSESSMENT & PLAN  44 yo male with left knee patellofemoral pain, contusion, arthritis, recent subluxation    I independently reviewed the following imaging studies:  Knee xrays: shows some mild arthritis in PF joint  After a 20 minute discussion and examination, we decided to perform a same day injection for diagnostic and therapeutic purposes for  Left knee  Given RX for mobic PRN  Given HEP  Followup PRN      Appropriate PPE was utilized for prevention of spread of Covid-19.  Carlos Harmon MD, [facility]    PROCEDURE:       left      Knee joint injection   The patient was apprised of the risks and the benefits of the procedure and consented and a written consent was signed.   The patient s  KNEE was sterilely prepped with chloraprep.   40 mg of triamcinolone suspension was drawn up into a 5 mL syringe with 4 mL of 1% lidocaine  The patient was injected into the knee with a 1.5-inch 22-gauge needle at the_superiorlateral aspect of their knee joint  There were no complications. The patient tolerated the procedure well. There was minimal bleeding.   The patient was instructed to ice the knee upon leaving clinic and refrain from overuse over the next 3 days.   The patient was instructed to go to the emergency room with any usual pain, swelling, or redness occurred in the injected area.   The patient was given a followup appointment to evaluate response to the injection to their increased range of motion and reduction of pain.  Follow up PRN  Dr Carlos Harmon     Large Joint Injection: L knee joint    Date/Time: 4/25/2024 5:16 PM    Performed by: Carlos Harmon MD  Authorized by: Carlos Harmon MD    Indications:  Pain and osteoarthritis  Needle Size:  22 G  Guidance: ultrasound    Approach:  Superolateral  Location:  Knee      Medications:  40 mg triamcinolone 40 MG/ML; 3 mL lidocaine (PF) 1 %  Outcome:  Tolerated well, no immediate complications  Procedure discussed: discussed risks,  benefits, and alternatives    Consent Given by:  Patient  Timeout: timeout called immediately prior to procedure    Prep: patient was prepped and draped in usual sterile fashion          Again, thank you for allowing me to participate in the care of your patient.      Sincerely,    Carlos Harmon MD

## 2024-04-25 NOTE — NURSING NOTE
[address] [address]  Dept: [phone]  ______________________________________________________________________________    Patient: Chad M Johanson   : 1980   MRN: [id number]   2024    INVASIVE PROCEDURE SAFETY CHECKLIST    Date: 2024   Procedure: left knee joint injection with kenalog  Patient Name: Chad M Johanson  MRN: [id number]  YOB: 1980    Action: Complete sections as appropriate. Any discrepancy results in a HARD COPY until resolved.     PRE PROCEDURE:  Patient ID verified with 2 identifiers (name and  or MRN): Yes  Procedure and site verified with patient/designee (when able): Yes  Accurate consent documentation in medical record: Yes  H&P (or appropriate assessment) documented in medical record: Yes  H&P must be up to 20 days prior to procedure and updates within 24 hours of procedure as applicable: NA  Relevant diagnostic and radiology test results appropriately labeled and displayed as applicable: NA  Procedure site(s) marked with provider initials: NA    TIMEOUT:  Time-Out performed immediately prior to starting procedure, including verbal and active participation of all team members addressing the following:Yes  * Correct patient identify  * Confirmed that the correct side and site are marked  * An accurate procedure consent form  * Agreement on the procedure to be done  * Correct patient position  * Relevant images and results are properly labeled and appropriately displayed  * The need to administer antibiotics or fluids for irrigation purposes during the procedure as applicable   * Safety precautions based on patient history or medication use    DURING PROCEDURE: Verification of correct person, site, and procedures any time the responsibility for care of the patient is transferred to another member of the care team.       Prior to injection, verified patient identity using patient's name  and date of birth.  Due to injection administration, patient instructed to remain in clinic for 15 minutes  afterwards, and to report any adverse reaction to me immediately.    Joint injection was performed.      Lido   Drug Amount Wasted:  Yes: 2 mg/ml   Vial/Syringe: Single dose vial  Expiration Date:  05/01/2027    Kenalog   Drug Amount Wasted: None   Vial/Syringe: Single dose vial  Expiration Date: 12/01/2025    Desi Alcaraz, ATC  April 25, 2024

## 2024-05-30 ENCOUNTER — VIRTUAL VISIT (OUTPATIENT)
Dept: ORTHOPEDICS | Facility: CLINIC | Age: 44
End: 2024-05-30
Payer: COMMERCIAL

## 2024-05-30 DIAGNOSIS — S83.002A PATELLAR SUBLUXATION, LEFT, INITIAL ENCOUNTER: Primary | ICD-10-CM

## 2024-05-30 DIAGNOSIS — S83.242A ACUTE MEDIAL MENISCUS TEAR OF LEFT KNEE, INITIAL ENCOUNTER: ICD-10-CM

## 2024-05-30 DIAGNOSIS — S83.412A SPRAIN OF MEDIAL COLLATERAL LIGAMENT OF LEFT KNEE, INITIAL ENCOUNTER: ICD-10-CM

## 2024-05-30 PROCEDURE — 99442 PR PHYSICIAN TELEPHONE EVALUATION 11-20 MIN: CPT | Performed by: PREVENTIVE MEDICINE

## 2024-05-30 RX ORDER — METHYLPREDNISOLONE 4 MG
TABLET, DOSE PACK ORAL
Qty: 21 TABLET | Refills: 0 | Status: SHIPPED | OUTPATIENT
Start: 2024-05-30 | End: 2024-07-08

## 2024-05-30 NOTE — LETTER
5/30/2024       RE: Chad M Johanson  [address]  [facility] [facility] [address]     Dear Colleague,    Thank you for referring your patient, Chad M Johanson, to the [facility] [place] at [facility]. Please see a copy of my visit note below.    Patient is a   43  year old who is being evaluated via a billable telephone visit.      What phone number would you like to be contacted at? CELL  How would you like to obtain your AVS? MYCHART        Subjective   Patient is a   43  year old who presents by phone call visit for the following:     HPI   Followup for left knee pain  Had a recurrent injury 2 days ago when his knee gave out climbing a ladder  And has been swollen and sore ever since  Painful to walk  And concerned about worsened injury    Review of Systems   Constitutional, HEENT, cardiovascular, pulmonary, gi and gu systems are negative, except as otherwise noted.      Objective           Vitals:  No vitals were obtained today due to virtual visit.    Physical Exam   healthy, alert, and no distress  PSYCH: Alert and oriented times 3; coherent speech, normal   rate and volume, able to articulate logical thoughts, able   to abstract reason, no tangential thoughts, no hallucinations   or delusions  His affect is normal  RESP: No cough, no audible wheezing, able to talk in full sentences  Remainder of exam unable to be completed due to telephone visits    Assessment/Plan  44 yo male with left knee acute swelling and pain , possible patellar subluxation injury, MCL injury and meniscus injury    I independently reviewed the following imaging studies and discussed with patient:  Left knee xrays: shows some arthritis  Discussed and ordered MRI knee  RX given for medrol  Knee brace PRN  Ice PRN  Followup after MRI          Phone call duration: 20 minutes  Phone call start: 500pm  Phone call end: 520pm  Dr Harmon      Again, thank you for allowing  me to participate in the care of your patient.      Sincerely,    Carlos Harmon MD

## 2024-05-30 NOTE — PROGRESS NOTES
Patient is a   43  year old who is being evaluated via a billable telephone visit.      What phone number would you like to be contacted at? CELL  How would you like to obtain your AVS? ELMO        Subjective   Patient is a   43  year old who presents by phone call visit for the following:     HPI   Followup for left knee pain  Had a recurrent injury 2 days ago when his knee gave out climbing a ladder  And has been swollen and sore ever since  Painful to walk  And concerned about worsened injury    Review of Systems   Constitutional, HEENT, cardiovascular, pulmonary, gi and gu systems are negative, except as otherwise noted.      Objective           Vitals:  No vitals were obtained today due to virtual visit.    Physical Exam   healthy, alert, and no distress  PSYCH: Alert and oriented times 3; coherent speech, normal   rate and volume, able to articulate logical thoughts, able   to abstract reason, no tangential thoughts, no hallucinations   or delusions  His affect is normal  RESP: No cough, no audible wheezing, able to talk in full sentences  Remainder of exam unable to be completed due to telephone visits    Assessment/Plan  42 yo male with left knee acute swelling and pain , possible patellar subluxation injury, MCL injury and meniscus injury    I independently reviewed the following imaging studies and discussed with patient:  Left knee xrays: shows some arthritis  Discussed and ordered MRI knee  RX given for medrol  Knee brace PRN  Ice PRN  Followup after MRI          Phone call duration: 20 minutes  Phone call start: 500pm  Phone call end: 520pm  Dr Harmon

## 2024-05-31 ENCOUNTER — TELEPHONE (OUTPATIENT)
Dept: ORTHOPEDICS | Facility: CLINIC | Age: 44
End: 2024-05-31
Payer: COMMERCIAL

## 2024-05-31 NOTE — TELEPHONE ENCOUNTER
Left Voicemail (1st Attempt) and Sent Mychart (1st Attempt) for the patient to call back and schedule the following:    Appointment type: MRI  Provider: Dr. Harmon  Return date: next available

## 2024-05-31 NOTE — TELEPHONE ENCOUNTER
----- Message from Fernie Capone ATC sent at 5/30/2024  5:10 PM CDT -----  Hello,    Please help Gulshan schedule an MRI. No follow up needed.          Thank you,    Fernie

## 2024-06-09 ENCOUNTER — HOSPITAL ENCOUNTER (OUTPATIENT)
Dept: MRI IMAGING | Facility: CLINIC | Age: 44
Discharge: HOME | End: 2024-06-09
Attending: PREVENTIVE MEDICINE | Admitting: PREVENTIVE MEDICINE
Payer: COMMERCIAL

## 2024-06-09 DIAGNOSIS — S83.242A ACUTE MEDIAL MENISCUS TEAR OF LEFT KNEE, INITIAL ENCOUNTER: ICD-10-CM

## 2024-06-09 DIAGNOSIS — S83.002A PATELLAR SUBLUXATION, LEFT, INITIAL ENCOUNTER: ICD-10-CM

## 2024-06-09 DIAGNOSIS — S83.412A SPRAIN OF MEDIAL COLLATERAL LIGAMENT OF LEFT KNEE, INITIAL ENCOUNTER: ICD-10-CM

## 2024-06-09 PROCEDURE — 73721 MRI JNT OF LWR EXTRE W/O DYE: CPT | Mod: 26 | Performed by: RADIOLOGY

## 2024-06-09 PROCEDURE — 73721 MRI JNT OF LWR EXTRE W/O DYE: CPT | Mod: LT

## 2024-06-11 ENCOUNTER — VIRTUAL VISIT (OUTPATIENT)
Dept: ORTHOPEDICS | Facility: CLINIC | Age: 44
End: 2024-06-11
Payer: COMMERCIAL

## 2024-06-11 ENCOUNTER — TELEPHONE (OUTPATIENT)
Dept: ORTHOPEDICS | Facility: CLINIC | Age: 44
End: 2024-06-11

## 2024-06-11 DIAGNOSIS — S89.92XD ACUTE INJURY OF ANTERIOR CRUCIATE LIGAMENT, LEFT, SUBSEQUENT ENCOUNTER: Primary | ICD-10-CM

## 2024-06-11 DIAGNOSIS — S83.242D ACUTE MEDIAL MENISCUS TEAR OF LEFT KNEE, SUBSEQUENT ENCOUNTER: ICD-10-CM

## 2024-06-11 PROCEDURE — 99442 PR PHYSICIAN TELEPHONE EVALUATION 11-20 MIN: CPT | Performed by: PREVENTIVE MEDICINE

## 2024-06-11 NOTE — PROGRESS NOTES
Patient is a   43  year old who is being evaluated via a billable telephone visit.      What phone number would you like to be contacted at? CELL  How would you like to obtain your AVS? ELMO        Subjective   Patient is a  43   year old who presents by phone call visit for the following:     HPI   Followup for left knee MRI  Still having pain and instability  Pain is slightly improved, but not resolved      Review of Systems   Constitutional, HEENT, cardiovascular, pulmonary, gi and gu systems are negative, except as otherwise noted.      Objective           Vitals:  No vitals were obtained today due to virtual visit.    Physical Exam   healthy, alert, and no distress  PSYCH: Alert and oriented times 3; coherent speech, normal   rate and volume, able to articulate logical thoughts, able   to abstract reason, no tangential thoughts, no hallucinations   or delusions  His affect is normal  RESP: No cough, no audible wheezing, able to talk in full sentences  Remainder of exam unable to be completed due to telephone visits    Assessment/Plan  44 yo male with left knee instability and pain after fall due to meniscus tear and ACL injury    I independently reviewed the following imaging studies and discussed with patient:  Left knee MRI: shows 1. Contusion/bone marrow edema along the lateral femoral condyle and  posterolateral tibial plateau with high-grade partial thickness tear  of the proximal anterior cruciate ligament. Mild anterior translation  of the knee.     2. Peripheral vertical medial meniscus tear, possible ramp lesion,  with edema at the meniscocapsular junction.     3. Low-grade sprain of the tibial collateral ligament.     4. Grade 3 cartilage loss along the medial facet of the patella.     5. Trace joint effusion.     6. Posterior cruciate ligament and lateral supporting structures are  intact. Lateral meniscus is intact.    Discussed referral to knee surgeon for further evaluation and treatment  options            Phone call duration: 20 minutes  Phone call start: 1040am  Phone call end: 1100am  Dr Harmon

## 2024-06-11 NOTE — LETTER
6/11/2024      Chad M Johanson  [address] [address]      Dear Colleague,    Thank you for referring your patient, Chad M Johanson, to the [facility] [place]. Please see a copy of my visit note below.    Patient is a   43  year old who is being evaluated via a billable telephone visit.      What phone number would you like to be contacted at? CELL  How would you like to obtain your AVS? MYCHART        Subjective   Patient is a  43   year old who presents by phone call visit for the following:     HPI   Followup for left knee MRI  Still having pain and instability  Pain is slightly improved, but not resolved      Review of Systems   Constitutional, HEENT, cardiovascular, pulmonary, gi and gu systems are negative, except as otherwise noted.      Objective           Vitals:  No vitals were obtained today due to virtual visit.    Physical Exam   healthy, alert, and no distress  PSYCH: Alert and oriented times 3; coherent speech, normal   rate and volume, able to articulate logical thoughts, able   to abstract reason, no tangential thoughts, no hallucinations   or delusions  His affect is normal  RESP: No cough, no audible wheezing, able to talk in full sentences  Remainder of exam unable to be completed due to telephone visits    Assessment/Plan  42 yo male with left knee instability and pain after fall due to meniscus tear and ACL injury    I independently reviewed the following imaging studies and discussed with patient:  Left knee MRI: shows 1. Contusion/bone marrow edema along the lateral femoral condyle and  posterolateral tibial plateau with high-grade partial thickness tear  of the proximal anterior cruciate ligament. Mild anterior translation  of the knee.     2. Peripheral vertical medial meniscus tear, possible ramp lesion,  with edema at the meniscocapsular junction.     3. Low-grade sprain of the tibial collateral ligament.     4. Grade 3 cartilage loss  along the medial facet of the patella.     5. Trace joint effusion.     6. Posterior cruciate ligament and lateral supporting structures are  intact. Lateral meniscus is intact.    Discussed referral to knee surgeon for further evaluation and treatment options            Phone call duration: 20 minutes  Phone call start: 1040am  Phone call end: 1100am  Dr Harmon      Again, thank you for allowing me to participate in the care of your patient.        Sincerely,        Carlos Harmon MD

## 2024-06-11 NOTE — LETTER
6/11/2024      Chad M Johanson  [address] [address]      Dear Colleague,    Thank you for referring your patient, Chad M Johanson, to the [facility] [place]. Please see a copy of my visit note below.    Patient is a   43  year old who is being evaluated via a billable telephone visit.      What phone number would you like to be contacted at? CELL  How would you like to obtain your AVS? MYCHART        Subjective   Patient is a  43   year old who presents by phone call visit for the following:     HPI   Followup for left knee MRI  Still having pain and instability  Pain is slightly improved, but not resolved      Review of Systems   Constitutional, HEENT, cardiovascular, pulmonary, gi and gu systems are negative, except as otherwise noted.      Objective           Vitals:  No vitals were obtained today due to virtual visit.    Physical Exam   healthy, alert, and no distress  PSYCH: Alert and oriented times 3; coherent speech, normal   rate and volume, able to articulate logical thoughts, able   to abstract reason, no tangential thoughts, no hallucinations   or delusions  His affect is normal  RESP: No cough, no audible wheezing, able to talk in full sentences  Remainder of exam unable to be completed due to telephone visits    Assessment/Plan  42 yo male with left knee instability and pain after fall due to meniscus tear and ACL injury    I independently reviewed the following imaging studies and discussed with patient:  Left knee MRI: shows 1. Contusion/bone marrow edema along the lateral femoral condyle and  posterolateral tibial plateau with high-grade partial thickness tear  of the proximal anterior cruciate ligament. Mild anterior translation  of the knee.     2. Peripheral vertical medial meniscus tear, possible ramp lesion,  with edema at the meniscocapsular junction.     3. Low-grade sprain of the tibial collateral ligament.     4. Grade 3 cartilage loss  along the medial facet of the patella.     5. Trace joint effusion.     6. Posterior cruciate ligament and lateral supporting structures are  intact. Lateral meniscus is intact.    Discussed referral to knee surgeon for further evaluation and treatment options            Phone call duration: 20 minutes  Phone call start: 1040am  Phone call end: 1100am  Dr Harmon      Again, thank you for allowing me to participate in the care of your patient.        Sincerely,        Carlos Harmon MD

## 2024-06-12 ENCOUNTER — TELEPHONE (OUTPATIENT)
Dept: ORTHOPEDICS | Facility: CLINIC | Age: 44
End: 2024-06-12

## 2024-06-12 ENCOUNTER — PRE VISIT (OUTPATIENT)
Dept: ORTHOPEDICS | Facility: CLINIC | Age: 44
End: 2024-06-12

## 2024-06-12 ENCOUNTER — OFFICE VISIT (OUTPATIENT)
Dept: ORTHOPEDICS | Facility: CLINIC | Age: 44
End: 2024-06-12
Payer: COMMERCIAL

## 2024-06-12 VITALS — WEIGHT: 230 LBS | HEIGHT: 75 IN | BODY MASS INDEX: 28.6 KG/M2

## 2024-06-12 DIAGNOSIS — M25.562 CHRONIC PAIN OF LEFT KNEE: Primary | ICD-10-CM

## 2024-06-12 DIAGNOSIS — G89.29 CHRONIC PAIN OF LEFT KNEE: Primary | ICD-10-CM

## 2024-06-12 PROCEDURE — 99204 OFFICE O/P NEW MOD 45 MIN: CPT | Performed by: ORTHOPAEDIC SURGERY

## 2024-06-12 NOTE — LETTER
6/12/2024      Chad M Johanson  [address] [address]      Dear Colleague,    Thank you for referring your patient, Chad M Johanson, to the [facility] [place]. Please see a copy of my visit note below.    CHIEF CONCERN: Left knee pain and instability    HISTORY:   Chad Johanson is a 43 year old male who presents to clinic today in consultation of his left knee. A few weeks ago, he was jumping on a trampoline and landed awkwardly on his left knee, sustaining a twisting injury. He felt a pop and had some mild swelling. Since then, he has notices a sensation of instability and knee buckling during his work activities as an . He has also modified his activities in an attempt to avoid this. He has been in a brace and iced his knee, but continues to have symptoms. He also has posteriomedial joint line pain. He presents today to discuss treatment.    PAST MEDICAL HISTORY: (Reviewed with the patient and in the [facility] medical record)  No past medical history on file.    PAST SURGICAL HISTORY: (Reviewed with the patient and in the [facility] medical record)  Past Surgical History:   Procedure Laterality Date     LAPAROSCOPIC APPENDECTOMY N/A 6/12/2022    Procedure: APPENDECTOMY, LAPAROSCOPIC;  Surgeon: Rajendra Lennon MD;  Location: [facility] OR       MEDICATIONS: (Reviewed with the patient and in the [facility] medical record)    Notable medications include:   Current Outpatient Medications   Medication Sig Dispense Refill     meloxicam (MOBIC) 15 MG tablet Take 1 tablet (15 mg) by mouth daily as needed for moderate pain 30 tablet 0     methylPREDNISolone (MEDROL DOSEPAK) 4 MG tablet therapy pack Follow Package Directions 21 tablet 0     sildenafil (VIAGRA) 100 MG tablet Take 0.5-1 tablets ( mg) by mouth daily as needed 30 tablet 3     Current Facility-Administered Medications   Medication Dose Route Frequency Provider Last Rate Last Admin     lidocaine (PF)  (XYLOCAINE) 1 % injection 3 mL  3 mL      3 mL at 04/25/24 1716     triamcinolone (KENALOG-40) injection 40 mg  40 mg      40 mg at 04/25/24 1716         ALLERGIES: (Reviewed with the patient and in the [facility] medical record)  none      SOCIAL HISTORY: (Reviewed with the patient and in the medical record)  --Tobacco: former  --Occupation:   --Avocation/Sport: golf, pickelball, walking    FAMILY HISTORY: (Reviewed with the patient and in the medical record)  -- No family history of bleeding, clotting, or difficulty with anesthesia    REVIEW OF SYSTEMS: (Reviewed with the patient and on the health intake form)  -- A comprehensive 10 point review of systems was conducted and is negative except as noted in the HPI    EXAM:     General: Awake, Alert and Oriented, No acute Distress. Articulate and Interactive    Body mass index is 28.75 kg/m .    left Lower extremity :  Skin is Warm and Well perfused, no suggestion of infection  ROM: 0-135  Stable to varus and valgus stress  2B lachman  Normal posterior drawer  Medial joint line tenderness to palpation  EHL/FHL/TA/GS 5/5  Sensation intact L3-S1  2+ Dorsalis Pedis Pulse    IMAGING:    Radiographs of the left knee from 4/25/2024 were independently reviewed by me and findings were discussed with the patient today. The imaging demonstrates   1. No acute osseous abnormality.  2. Mild medial compartment joint space loss bilaterally.  3. Bilateral patella stephanie..    MRI of the left knee from 6/9/2024 were independently reviewed by me and findings were discussed with the patient today. The imaging demonstrates:  1. Contusion/bone marrow edema along the lateral femoral condyle and  posterolateral tibial plateau with high-grade partial thickness tear  of the proximal anterior cruciate ligament. Mild anterior translation  of the knee.     2. Peripheral vertical medial meniscus tear, possible ramp lesion,  with edema at the meniscocapsular junction.     3. Low-grade sprain of  the tibial collateral ligament.     4. Grade 3 cartilage loss along the medial facet of the patella.     5. Trace joint effusion.     6. Posterior cruciate ligament and lateral supporting structures are  intact. Lateral meniscus is intact..    ASSESSMENT:  Left knee ACL tear  Left knee medial meniscus tear      PLAN:  Surgery: Left knee exam under anesthesia, arthroscopy, ACL reconstruction with quad tendon autograft, possible medial meniscus repair    We discussed the etiology of his symptoms as well as treatment options. As he has an ACL tear which continues to give him sensations of instability, and he would like to get back to cutting and pivoting activities, we discussed surgical management. We discussed graft options and settled on quad tendon autograft. We will also be ready to perform a meniscal repair if needed. We discussed the procedure in detail as well as the risks, benefits, alternatives, and convalescence, and he would like to proceed. We will get him scheduled for a mutually convenient date.  I discussed with the patient the risks, benefits, complications and techniques of surgery as well as the natural history of ACL tears, meniscus tears and the alternative treatment options.    The risks include, but are not limited to the risk of death and risk of a myocardial infarction, risk of bleeding and a risk of infection, risk of nerve damage and a risk of muscle damage, stiffness, instability, continued pain or worsening pain and retear of the ACL, retear of her meniscus.    The patient was provided an opportunity to ask questions and these were answered.          Again, thank you for allowing me to participate in the care of your patient.        Sincerely,        Cas Meza MD

## 2024-06-12 NOTE — TELEPHONE ENCOUNTER
DIAGNOSIS: L knee ACL+ Meniscus consult, ref by Dr. Harmon    APPOINTMENT DATE: 6/12/24   NOTES STATUS DETAILS   OFFICE NOTE from referring provider Internal 6/11/24 - Carlos Harmon MD - sports med   MEDICATION LIST Internal    MRI Internal 6/9/24 - MR Knee Left   XRAYS (IMAGES & REPORTS) Internal 4/25/24 - XR Knee Nolan

## 2024-06-12 NOTE — TELEPHONE ENCOUNTER
Patient is scheduled for surgery with Dr. Meza.     Spoke with: Patient     Date of Surgery: 6/18/2024    Location: [facility] OR     Pre op with Provider: JAVIER     H&P: Patient is scheduled for a virtual visit with PAC on 6/13/24 at 11:00 AM.     Additional imaging/appointments: Patient is scheduled for a 1 week post op with Florecita on 6/25/24 at 2:40 PM.   Patient is scheduled for a 6 week post op with Dr. Meza on 7/31/24 at 10:20 AM.     Surgery packet: Patient received surgery packet in clinic.      Additional comments: Informed patient that with scheduling in a short time frame there is a risk that PA team may not have authorization completed prior to date of surgery. Let patient know should that be the case, we would make patient aware and reschedule surgery to next available date. Patient confirmed understanding.         Dayanara Velasco on 6/12/2024 at 2:09 PM

## 2024-06-12 NOTE — TELEPHONE ENCOUNTER
FUTURE VISIT INFORMATION      SURGERY INFORMATION:  Date: 6/18/24  Location:  or  Surgeon:  Cas Meza MD   Anesthesia Type:  choice  Procedure: left knee examination under anesthesia, knee arthroscopy, anterior cruciate ligament reconstruction with quadricepts tendon autograft, meniscus surgery     RECORDS REQUESTED FROM:       Primary Care Provider:   Carlos Joe, [facility]

## 2024-06-12 NOTE — NURSING NOTE
Teaching Flowsheet   Relevant Diagnosis: Left knee arthroscopy, ACL reconstruction with quad tendon autograft, meniscus surgery  Teaching Topic: Pre operative care     Person(s) involved in teaching:   Patient     Motivation Level:  Asks Questions: Yes  Eager to Learn: Yes  Cooperative: Yes  Receptive (willing/able to accept information): Yes  Any cultural factors/Catholic beliefs that may influence understanding or compliance? No       Patient demonstrates understanding of the following:  Reason for the appointment, diagnosis and treatment plan: Yes  Knowledge of proper use of medications and conditions for which they are ordered (with special attention to potential side effects or drug interactions): Yes  Which situations necessitate calling provider and whom to contact: Yes       Teaching Concerns Addressed: Pre operative care       Proper use and care of brace and crutches (medical equip, care aids, etc.): No, explain: will be provided at surgery  Nutritional needs and diet plan: Yes  Pain management techniques: Yes  Wound Care: Yes  How and/when to access community resources: Yes     Instructional Materials Used/Given: Pre operative instructions and surgical soap     Time spent with patient: 15 minutes.    DAVID Barnes

## 2024-06-12 NOTE — PROGRESS NOTES
CHIEF CONCERN: Left knee pain and instability    HISTORY:   Chad Johanson is a 43 year old male who presents to clinic today in consultation of his left knee. A few weeks ago, he was jumping on a trampoline and landed awkwardly on his left knee, sustaining a twisting injury. He felt a pop and had some mild swelling. Since then, he has notices a sensation of instability and knee buckling during his work activities as an . He has also modified his activities in an attempt to avoid this. He has been in a brace and iced his knee, but continues to have symptoms. He also has posteriomedial joint line pain. He presents today to discuss treatment.    PAST MEDICAL HISTORY: (Reviewed with the patient and in the [facility] medical record)  No past medical history on file.    PAST SURGICAL HISTORY: (Reviewed with the patient and in the [facility] medical record)  Past Surgical History:   Procedure Laterality Date    LAPAROSCOPIC APPENDECTOMY N/A 6/12/2022    Procedure: APPENDECTOMY, LAPAROSCOPIC;  Surgeon: Rajendra Lennon MD;  Location: [facility] OR       MEDICATIONS: (Reviewed with the patient and in the [facility] medical record)    Notable medications include:   Current Outpatient Medications   Medication Sig Dispense Refill    meloxicam (MOBIC) 15 MG tablet Take 1 tablet (15 mg) by mouth daily as needed for moderate pain 30 tablet 0    methylPREDNISolone (MEDROL DOSEPAK) 4 MG tablet therapy pack Follow Package Directions 21 tablet 0    sildenafil (VIAGRA) 100 MG tablet Take 0.5-1 tablets ( mg) by mouth daily as needed 30 tablet 3     Current Facility-Administered Medications   Medication Dose Route Frequency Provider Last Rate Last Admin    lidocaine (PF) (XYLOCAINE) 1 % injection 3 mL  3 mL      3 mL at 04/25/24 1716    triamcinolone (KENALOG-40) injection 40 mg  40 mg      40 mg at 04/25/24 1716         ALLERGIES: (Reviewed with the patient and in the [facility] medical record)  none      SOCIAL HISTORY: (Reviewed with  the patient and in the medical record)  --Tobacco: former  --Occupation:   --Avocation/Sport: golf, pickelball, walking    FAMILY HISTORY: (Reviewed with the patient and in the medical record)  -- No family history of bleeding, clotting, or difficulty with anesthesia    REVIEW OF SYSTEMS: (Reviewed with the patient and on the health intake form)  -- A comprehensive 10 point review of systems was conducted and is negative except as noted in the HPI    EXAM:     General: Awake, Alert and Oriented, No acute Distress. Articulate and Interactive    Body mass index is 28.75 kg/m .    left Lower extremity :  Skin is Warm and Well perfused, no suggestion of infection  ROM: 0-135  Stable to varus and valgus stress  2B lachman  Normal posterior drawer  Medial joint line tenderness to palpation  EHL/FHL/TA/GS 5/5  Sensation intact L3-S1  2+ Dorsalis Pedis Pulse    IMAGING:    Radiographs of the left knee from 4/25/2024 were independently reviewed by me and findings were discussed with the patient today. The imaging demonstrates   1. No acute osseous abnormality.  2. Mild medial compartment joint space loss bilaterally.  3. Bilateral patella stephanie..    MRI of the left knee from 6/9/2024 were independently reviewed by me and findings were discussed with the patient today. The imaging demonstrates:  1. Contusion/bone marrow edema along the lateral femoral condyle and  posterolateral tibial plateau with high-grade partial thickness tear  of the proximal anterior cruciate ligament. Mild anterior translation  of the knee.     2. Peripheral vertical medial meniscus tear, possible ramp lesion,  with edema at the meniscocapsular junction.     3. Low-grade sprain of the tibial collateral ligament.     4. Grade 3 cartilage loss along the medial facet of the patella.     5. Trace joint effusion.     6. Posterior cruciate ligament and lateral supporting structures are  intact. Lateral meniscus is intact..    ASSESSMENT:  Left  knee ACL tear  Left knee medial meniscus tear      PLAN:  Surgery: Left knee exam under anesthesia, arthroscopy, ACL reconstruction with quad tendon autograft, possible medial meniscus repair    We discussed the etiology of his symptoms as well as treatment options. As he has an ACL tear which continues to give him sensations of instability, and he would like to get back to cutting and pivoting activities, we discussed surgical management. We discussed graft options and settled on quad tendon autograft. We will also be ready to perform a meniscal repair if needed. We discussed the procedure in detail as well as the risks, benefits, alternatives, and convalescence, and he would like to proceed. We will get him scheduled for a mutually convenient date.  I discussed with the patient the risks, benefits, complications and techniques of surgery as well as the natural history of ACL tears, meniscus tears and the alternative treatment options.    The risks include, but are not limited to the risk of death and risk of a myocardial infarction, risk of bleeding and a risk of infection, risk of nerve damage and a risk of muscle damage, stiffness, instability, continued pain or worsening pain and retear of the ACL, retear of her meniscus.    The patient was provided an opportunity to ask questions and these were answered.

## 2024-06-13 ENCOUNTER — ANESTHESIA EVENT (OUTPATIENT)
Dept: SURGERY | Facility: AMBULATORY SURGERY CENTER | Age: 44
End: 2024-06-13
Payer: COMMERCIAL

## 2024-06-13 ENCOUNTER — PRE VISIT (OUTPATIENT)
Dept: SURGERY | Facility: CLINIC | Age: 44
End: 2024-06-13

## 2024-06-14 NOTE — TELEPHONE ENCOUNTER
FUTURE VISIT INFORMATION        SURGERY INFORMATION:  Date: 7/26/24  Location:  or  Surgeon:  Cas Meza MD   Anesthesia Type:  choice  Procedure: left knee examination under anesthesia, knee arthroscopy, anterior cruciate ligament reconstruction with quadricepts tendon autograft, meniscus surgery      RECORDS REQUESTED FROM:         Primary Care Provider:   Carlos Joe, [facility]

## 2024-07-08 ENCOUNTER — VIRTUAL VISIT (OUTPATIENT)
Dept: SURGERY | Facility: CLINIC | Age: 44
End: 2024-07-08
Payer: COMMERCIAL

## 2024-07-08 ENCOUNTER — PRE VISIT (OUTPATIENT)
Dept: SURGERY | Facility: CLINIC | Age: 44
End: 2024-07-08

## 2024-07-08 VITALS — BODY MASS INDEX: 26.73 KG/M2 | WEIGHT: 215 LBS | HEIGHT: 75 IN

## 2024-07-08 DIAGNOSIS — Z01.818 PRE-OP EVALUATION: Primary | ICD-10-CM

## 2024-07-08 PROCEDURE — 99202 OFFICE O/P NEW SF 15 MIN: CPT | Mod: 95 | Performed by: PHYSICIAN ASSISTANT

## 2024-07-08 ASSESSMENT — ENCOUNTER SYMPTOMS: SEIZURES: 0

## 2024-07-08 ASSESSMENT — PAIN SCALES - GENERAL: PAINLEVEL: MODERATE PAIN (4)

## 2024-07-08 ASSESSMENT — LIFESTYLE VARIABLES: TOBACCO_USE: 1

## 2024-07-08 NOTE — PROGRESS NOTES
Gulshan is a 43 year old who is being evaluated via a billable video visit.    How would you like to obtain your AVS? MyChart  If the video visit is dropped, the invitation should be resent by: Text to cell phone: [phone]    Subjective   Gulshan is a 43 year old, presenting for the following health issues:  Pre-Op Exam    HPI           Physical Exam

## 2024-07-08 NOTE — PATIENT INSTRUCTIONS
Preparing for Your Surgery      Name:  Chad M Johanson   MRN:  [id number]   :  1980   Today's Date:  2024         Arriving for surgery:  Surgery date:  24  Arrival time:  9:45 am  Surgery time: 11:15 am    Restrictions due to COVID 19:    Please maintain social distance.  Masking is optional        parking is available for anyone with mobility limitations or disabilities. (Monday- Friday 7 am- 5 pm)    Please come to:    [facility]  [address] [place] [id number]    Check in on the 5th floor, Ambulatory Surgery Center.    What can I eat or drink?    -  You may eat and drink normally until 8 hours before arrival time  (Until 1:45 am)  -  You may have clear liquids until 2 hours before arrival time  (Until 7:45 am)    Examples of clear liquids:  Water  Clear broth  Juices (apple, white grape, white cranberry  and cider) without pulp  Noncarbonated, powder based beverages  (lemonade and Fabien-Aid)  Sodas (Sprite, 7-Up, ginger ale and seltzer)  Coffee or tea (without milk or cream)  Gatorade    --No alcohol or cannabis products for at least 24 hours before surgery    Which medicines can I take?    Hold Aspirin for 7 days before surgery.   Hold Multivitamins for 7 days before surgery.  Hold Supplements for 7 days before surgery.  Hold Ibuprofen (Advil, Motrin) for 1 day before surgery--unless otherwise directed by surgeon.  Hold Naproxen (Aleve) for 4 days before surgery.  Hold Meloxicam (Mobic) for 10 days before surgery.    -  DO NOT take the following medications the day of surgery:  Viagra - stop at least 24 hours before surgery  Topical medications - skip the day of surgery    -  PLEASE TAKE the following medications per your usual routine:  Tylenol if needed    How do I prepare myself?  - Please take 2 showers (one the night prior to surgery and one the morning of surgery) using Scrubcare or Hibiclens soap.    Use this soap only from the neck to your  toes.     Leave the soap on your skin for one minute--then rinse thoroughly.      You may use your own shampoo and conditioner; no other hair products.      Sleep in clean sheets and wear clean clothes.  - Please remove all jewelry and body piercings.  - No lotions, deodorants or fragrance.  - No makeup or fingernail polish.   - Bring your ID and insurance card.        ALL PATIENTS ARE REQUIRED TO HAVE A RESPONSIBLE ADULT TO DRIVE AND BE IN ATTENDANCE WITH THEM FOR 24 HOURS FOLLOWING SURGERY       Covid testing policy as of 12/06/2022  Your surgeon will notify and schedule you for a COVID test if one is needed before surgery--please direct any questions or COVID symptoms to your surgeon      Questions or Concerns:    -For questions regarding the day of surgery please contact the Ambulatory Surgery Center at [phone].    -If you have health changes between today and your surgery please contact your surgeon.     For questions after surgery please call your surgeons office.

## 2024-07-08 NOTE — H&P
Pre-Operative H & P     CC:  Preoperative exam to assess for increased cardiopulmonary risk while undergoing surgery and anesthesia.    Date of Encounter: 7/8/2024  Primary Care Physician:  Carlos Joe     Reason for visit:   Encounter Diagnosis   Name Primary?    Pre-op evaluation Yes       HPI  Chad M Johanson is a 43 year old male who presents for pre-operative H & P in preparation for  Procedure Information       Case: [id number] Date/Time: 07/26/24 1115    Procedure: left knee examination under anesthesia, knee arthroscopy, anterior cruciate ligament reconstruction with quadricepts tendon autograft, meniscus surgery (Left: Knee)    Anesthesia type: Choice    Diagnosis: Chronic pain of left knee [M25.562, G89.29]    Pre-op diagnosis: Chronic pain of left knee [M25.562, G89.29]    [facility] [place] [address] [facility]    Providers: Cas Meza MD            Patient is being evaluated for comorbid conditions of depression    Mr. Johanson has a known ACL tear and medial meniscus tear.  He has been evaluated by Dr. Escobedo.  He is scheduled for the above procedure.    History is obtained from the patient and chart review    Hx of abnormal bleeding or anti-platelet use: denies      Past Medical History  Past Medical History:   Diagnosis Date    Chronic pain of left knee     Depression     Unilateral small kidney        Past Surgical History  Past Surgical History:   Procedure Laterality Date    LAPAROSCOPIC APPENDECTOMY N/A 06/12/2022    Procedure: APPENDECTOMY, LAPAROSCOPIC;  Surgeon: Rajendra Lennon MD;  Location: [facility]    ORTHOPEDIC SURGERY      shoulder       Prior to Admission Medications  Current Outpatient Medications   Medication Sig Dispense Refill    sildenafil (VIAGRA) 100 MG tablet Take 0.5-1 tablets ( mg) by mouth daily as needed 30 tablet 3    meloxicam (MOBIC) 15 MG tablet Take 1 tablet (15 mg) by mouth  daily as needed for moderate pain (Patient not taking: Reported on 2024) 30 tablet 0       Allergies  No Known Allergies    Social History  Social History     Socioeconomic History    Marital status:      Spouse name: Not on file    Number of children: Not on file    Years of education: Not on file    Highest education level: Not on file   Occupational History    Not on file   Tobacco Use    Smoking status: Former     Current packs/day: 0.00     Types: Cigarettes     Quit date: 2013     Years since quittin.5    Smokeless tobacco: Never   Substance and Sexual Activity    Alcohol use: No    Drug use: Yes     Types: Marijuana     Comment: Uses THC daily    Sexual activity: Not on file   Other Topics Concern    Not on file   Social History Narrative    Not on file     Social Determinants of Health     Financial Resource Strain: Low Risk  (3/3/2024)    Financial Resource Strain     Within the past 12 months, have you or your family members you live with been unable to get utilities (heat, electricity) when it was really needed?: No   Food Insecurity: Low Risk  (3/3/2024)    Food Insecurity     Within the past 12 months, did you worry that your food would run out before you got money to buy more?: No     Within the past 12 months, did the food you bought just not last and you didn t have money to get more?: No   Transportation Needs: Low Risk  (3/3/2024)    Transportation Needs     Within the past 12 months, has lack of transportation kept you from medical appointments, getting your medicines, non-medical meetings or appointments, work, or from getting things that you need?: No   Physical Activity: Sufficiently Active (3/3/2024)    Exercise Vital Sign     Days of Exercise per Week: 7 days     Minutes of Exercise per Session: 60 min   Stress: No Stress Concern Present (3/3/2024)    [employer] [place] [employer] - Occupational Stress Questionnaire     Feeling of Stress : Only a little   Social  Connections: Unknown (3/3/2024)    Social Connection and Isolation Panel [NHANES]     Frequency of Communication with Friends and Family: Not on file     Frequency of Social Gatherings with Friends and Family: Twice a week     Attends Spiritism Services: Not on file     Active Member of Clubs or Organizations: Not on file     Attends Club or Organization Meetings: Not on file     Marital Status: Not on file   Interpersonal Safety: Low Risk  (3/4/2024)    Interpersonal Safety     Do you feel physically and emotionally safe where you currently live?: Yes     Within the past 12 months, have you been hit, slapped, kicked or otherwise physically hurt by someone?: No     Within the past 12 months, have you been humiliated or emotionally abused in other ways by your partner or ex-partner?: No   Housing Stability: Low Risk  (3/3/2024)    Housing Stability     Do you have housing? : Yes     Are you worried about losing your housing?: No       Family History  Family History   Problem Relation Age of Onset    Anxiety Disorder Mother     Diabetes Father     Cerebrovascular Disease Paternal Grandfather     Cerebrovascular Disease Paternal Uncle     Anesthesia Reaction No family hx of     Deep Vein Thrombosis (DVT) No family hx of        Review of Systems  The complete review of systems is negative other than noted in the HPI or here.   Anesthesia Evaluation   Pt has had prior anesthetic.     No history of anesthetic complications       ROS/MED HX  ENT/Pulmonary:     (+)                tobacco use, Past use,                       Neurologic:  - neg neurologic ROS  (-) no seizures and no CVA   Cardiovascular:    (-) taking anticoagulants/antiplatelets   METS/Exercise Tolerance: >4 METS Comment: Golf, physical at work   Hematologic:  - neg hematologic  ROS  (-) history of blood clots and history of blood transfusion   Musculoskeletal: Comment: Left knee ACL and medical meniscus tear       GI/Hepatic:  - neg GI/hepatic ROS  (-) GERD    Renal/Genitourinary:  - neg Renal ROS     Endo:  - neg endo ROS  (-) chronic steroid usage   Psychiatric/Substance Use:     (+) psychiatric history        Infectious Disease:  - neg infectious disease ROS     Malignancy:  - neg malignancy ROS     Other:            Virtual visit -  No vitals were obtained    Physical Exam  Constitutional: Awake, alert, cooperative, no apparent distress, and appears stated age.  HENT: Normocephalic  Respiratory: non labored breathing   Neurologic: Awake, alert, oriented to name, place and time.   Neuropsychiatric: Calm, cooperative. Normal affect.      Prior Labs/Diagnostic Studies   All labs and imaging personally reviewed     EKG/ stress test - if available please see in ROS above   No results found.       No data to display                  The patient's records and results personally reviewed by this provider.     Outside records reviewed from: Care Everywhere      Assessment    Chad M Johanson is a 43 year old male seen as a PAC referral for risk assessment and optimization for anesthesia.    Plan/Recommendations  Pt will be optimized for the proposed procedure.  See below for details on the assessment, risk, and preoperative recommendations    NEUROLOGY  - No history of TIA, CVA or seizure  -Post Op delirium risk factors:  No risk identified    ENT  - No current airway concerns.  Will need to be reassessed day of surgery.  Mallampati: Unable to assess  TM: Unable to assess    CARDIAC  - No history of CAD, Hypertension, and Afib  -denies cardiac history or symptoms   - METS (Metabolic Equivalents)  Patient performs 4 or more METS exercise without symptoms             Total Score: 0      RCRI-Very low risk: Class 1 0.4% complication rate             Total Score: 0        PULMONARY  BRADLEY Low Risk             Total Score: 1    BRADLEY: Male      - Denies asthma or inhaler use  - Tobacco History    History   Smoking Status    Former    Types: Cigarettes   Smokeless Tobacco    Never  "      GI  PONV Medium Risk  Total Score: 2           1 AN PONV: Patient is not a current smoker    1 AN PONV: Intended Post Op Opioids        /RENAL  - Baseline Creatinine 0.9    ENDOCRINE    - BMI: Estimated body mass index is 26.87 kg/m  as calculated from the following:    Height as of this encounter: 1.905 m (6' 3\").    Weight as of this encounter: 97.5 kg (215 lb).  Overweight (BMI 25.0-29.9)  - No history of Diabetes Mellitus    HEME  VTE Low Risk 0.5%             Total Score: 2    VTE: Male      - No history of abnormal bleeding or antiplatelet use.    MSK  -knee injury with the above procedure planned     Different anesthesia methods/types have been discussed with the patient, but they are aware that the final plan will be decided by the assigned anesthesia provider on the date of service.    The patient is optimized for their procedure. AVS with information on surgery time/arrival time, meds and NPO status given by nursing staff. No further diagnostic testing indicated.    Please refer to the physical examination documented by the anesthesiologist in the anesthesia record on the day of surgery.    Video-Visit Details    Type of service:  Video Visit    Provider received verbal consent for a Video Visit from the patient? Yes     Originating Location (pt. Location): Home    Distant Location (provider location):  Off-site  Mode of Communication:  Video Conference via [employer]  On the day of service:     Prep time: 7 minutes  Visit time: 6 minutes  Documentation time: 4 minutes  ------------------------------------------  Total time: 17 minutes      Karlee Naqvi PA-C  Preoperative Assessment Center  [facility]  Clinic and Surgery Center  Phone: [phone]  Fax: [phone]    "

## 2024-07-24 ENCOUNTER — MYC MEDICAL ADVICE (OUTPATIENT)
Dept: SURGERY | Facility: AMBULATORY SURGERY CENTER | Age: 44
End: 2024-07-24
Payer: COMMERCIAL

## 2024-07-26 ENCOUNTER — ANESTHESIA (OUTPATIENT)
Dept: SURGERY | Facility: AMBULATORY SURGERY CENTER | Age: 44
End: 2024-07-26
Payer: COMMERCIAL

## 2024-07-26 ENCOUNTER — HOSPITAL ENCOUNTER (OUTPATIENT)
Facility: AMBULATORY SURGERY CENTER | Age: 44
Discharge: HOME | End: 2024-07-26
Attending: ORTHOPAEDIC SURGERY
Payer: COMMERCIAL

## 2024-07-26 VITALS
HEART RATE: 80 BPM | WEIGHT: 220 LBS | HEIGHT: 75 IN | BODY MASS INDEX: 27.35 KG/M2 | RESPIRATION RATE: 16 BRPM | TEMPERATURE: 97 F | DIASTOLIC BLOOD PRESSURE: 92 MMHG | SYSTOLIC BLOOD PRESSURE: 140 MMHG | OXYGEN SATURATION: 98 %

## 2024-07-26 DIAGNOSIS — G89.29 CHRONIC PAIN OF LEFT KNEE: Primary | ICD-10-CM

## 2024-07-26 DIAGNOSIS — M25.562 CHRONIC PAIN OF LEFT KNEE: Primary | ICD-10-CM

## 2024-07-26 PROCEDURE — 29888 ARTHRS AID ACL RPR/AGMNTJ: CPT | Mod: LT

## 2024-07-26 PROCEDURE — C1762 CONN TISS, HUMAN(INC FASCIA): HCPCS

## 2024-07-26 PROCEDURE — 64447 NJX AA&/STRD FEMORAL NRV IMG: CPT | Mod: 59 | Performed by: ANESTHESIOLOGY

## 2024-07-26 PROCEDURE — 29888 ARTHRS AID ACL RPR/AGMNTJ: CPT | Performed by: ANESTHESIOLOGY

## 2024-07-26 PROCEDURE — 29882 ARTHRS KNE SRG MNISC RPR M/L: CPT | Mod: LT

## 2024-07-26 PROCEDURE — 29888 ARTHRS AID ACL RPR/AGMNTJ: CPT | Performed by: NURSE ANESTHETIST, CERTIFIED REGISTERED

## 2024-07-26 PROCEDURE — C1713 ANCHOR/SCREW BN/BN,TIS/BN: HCPCS

## 2024-07-26 DEVICE — IMPLANTABLE DEVICE
Type: IMPLANTABLE DEVICE | Site: KNEE | Status: FUNCTIONAL
Brand: FIBERSTITCH™ IMPLANT 1.5, CURVED WITH TWO POLYESTER IMPLANTS AND 2-0 FIBERWIRE®

## 2024-07-26 DEVICE — QUADLINK IMPLANT SYSTEM, 10MM
Type: IMPLANTABLE DEVICE | Site: KNEE | Status: FUNCTIONAL
Brand: ARTHREX®

## 2024-07-26 RX ORDER — OXYCODONE HYDROCHLORIDE 5 MG/1
5-10 TABLET ORAL EVERY 4 HOURS PRN
Qty: 20 TABLET | Refills: 0 | Status: SHIPPED | OUTPATIENT
Start: 2024-07-26 | End: 2024-07-30

## 2024-07-26 RX ORDER — AMOXICILLIN 250 MG
1-2 CAPSULE ORAL 2 TIMES DAILY
Qty: 30 TABLET | Refills: 0 | Status: SHIPPED | OUTPATIENT
Start: 2024-07-26 | End: 2024-09-09

## 2024-07-26 RX ORDER — PROPOFOL 10 MG/ML
INJECTION, EMULSION INTRAVENOUS PRN
Status: DISCONTINUED | OUTPATIENT
Start: 2024-07-26 | End: 2024-07-26

## 2024-07-26 RX ORDER — NALOXONE HYDROCHLORIDE 0.4 MG/ML
0.4 INJECTION, SOLUTION INTRAMUSCULAR; INTRAVENOUS; SUBCUTANEOUS
Status: DISCONTINUED | OUTPATIENT
Start: 2024-07-26 | End: 2024-07-26 | Stop reason: HOSPADM

## 2024-07-26 RX ORDER — HYDROXYZINE HYDROCHLORIDE 25 MG/1
25 TABLET, FILM COATED ORAL
Status: DISCONTINUED | OUTPATIENT
Start: 2024-07-26 | End: 2024-07-30 | Stop reason: HOSPADM

## 2024-07-26 RX ORDER — BUPIVACAINE HYDROCHLORIDE 5 MG/ML
INJECTION, SOLUTION EPIDURAL; INTRACAUDAL
Status: DISCONTINUED | OUTPATIENT
Start: 2024-07-26 | End: 2024-07-26

## 2024-07-26 RX ORDER — HYDROXYZINE HYDROCHLORIDE 25 MG/1
25 TABLET, FILM COATED ORAL 3 TIMES DAILY PRN
Qty: 20 TABLET | Refills: 0 | Status: SHIPPED | OUTPATIENT
Start: 2024-07-26 | End: 2024-09-09

## 2024-07-26 RX ORDER — CEFAZOLIN SODIUM 2 G/50ML
2 SOLUTION INTRAVENOUS
Status: COMPLETED | OUTPATIENT
Start: 2024-07-26 | End: 2024-07-26

## 2024-07-26 RX ORDER — ONDANSETRON 4 MG/1
4 TABLET, ORALLY DISINTEGRATING ORAL
Status: DISCONTINUED | OUTPATIENT
Start: 2024-07-26 | End: 2024-07-30 | Stop reason: HOSPADM

## 2024-07-26 RX ORDER — PROPOFOL 10 MG/ML
INJECTION, EMULSION INTRAVENOUS CONTINUOUS PRN
Status: DISCONTINUED | OUTPATIENT
Start: 2024-07-26 | End: 2024-07-26

## 2024-07-26 RX ORDER — OXYCODONE HYDROCHLORIDE 5 MG/1
5 TABLET ORAL
Status: COMPLETED | OUTPATIENT
Start: 2024-07-26 | End: 2024-07-26

## 2024-07-26 RX ORDER — FENTANYL CITRATE 50 UG/ML
INJECTION, SOLUTION INTRAMUSCULAR; INTRAVENOUS PRN
Status: DISCONTINUED | OUTPATIENT
Start: 2024-07-26 | End: 2024-07-26

## 2024-07-26 RX ORDER — ASPIRIN 81 MG/1
162 TABLET ORAL DAILY
Qty: 56 TABLET | Refills: 0 | Status: SHIPPED | OUTPATIENT
Start: 2024-07-26 | End: 2024-08-23

## 2024-07-26 RX ORDER — ACETAMINOPHEN 325 MG/1
975 TABLET ORAL ONCE
Status: COMPLETED | OUTPATIENT
Start: 2024-07-26 | End: 2024-07-26

## 2024-07-26 RX ORDER — OXYCODONE HYDROCHLORIDE 5 MG/1
5 TABLET ORAL
Status: DISCONTINUED | OUTPATIENT
Start: 2024-07-26 | End: 2024-07-30 | Stop reason: HOSPADM

## 2024-07-26 RX ORDER — ONDANSETRON 2 MG/ML
INJECTION INTRAMUSCULAR; INTRAVENOUS PRN
Status: DISCONTINUED | OUTPATIENT
Start: 2024-07-26 | End: 2024-07-26

## 2024-07-26 RX ORDER — FENTANYL CITRATE 50 UG/ML
25 INJECTION, SOLUTION INTRAMUSCULAR; INTRAVENOUS
Status: DISCONTINUED | OUTPATIENT
Start: 2024-07-26 | End: 2024-07-30 | Stop reason: HOSPADM

## 2024-07-26 RX ORDER — NALOXONE HYDROCHLORIDE 0.4 MG/ML
0.2 INJECTION, SOLUTION INTRAMUSCULAR; INTRAVENOUS; SUBCUTANEOUS
Status: DISCONTINUED | OUTPATIENT
Start: 2024-07-26 | End: 2024-07-26 | Stop reason: HOSPADM

## 2024-07-26 RX ORDER — ACETAMINOPHEN 325 MG/1
650 TABLET ORAL
Status: DISCONTINUED | OUTPATIENT
Start: 2024-07-26 | End: 2024-07-30 | Stop reason: HOSPADM

## 2024-07-26 RX ORDER — SODIUM CHLORIDE, SODIUM LACTATE, POTASSIUM CHLORIDE, CALCIUM CHLORIDE 600; 310; 30; 20 MG/100ML; MG/100ML; MG/100ML; MG/100ML
INJECTION, SOLUTION INTRAVENOUS CONTINUOUS
Status: DISCONTINUED | OUTPATIENT
Start: 2024-07-26 | End: 2024-07-26 | Stop reason: HOSPADM

## 2024-07-26 RX ORDER — DEXAMETHASONE SODIUM PHOSPHATE 4 MG/ML
INJECTION, SOLUTION INTRA-ARTICULAR; INTRALESIONAL; INTRAMUSCULAR; INTRAVENOUS; SOFT TISSUE PRN
Status: DISCONTINUED | OUTPATIENT
Start: 2024-07-26 | End: 2024-07-26

## 2024-07-26 RX ORDER — FENTANYL CITRATE 50 UG/ML
25 INJECTION, SOLUTION INTRAMUSCULAR; INTRAVENOUS EVERY 5 MIN PRN
Status: DISCONTINUED | OUTPATIENT
Start: 2024-07-26 | End: 2024-07-26 | Stop reason: HOSPADM

## 2024-07-26 RX ORDER — BUPIVACAINE HYDROCHLORIDE AND EPINEPHRINE 2.5; 5 MG/ML; UG/ML
INJECTION, SOLUTION INFILTRATION; PERINEURAL PRN
Status: DISCONTINUED | OUTPATIENT
Start: 2024-07-26 | End: 2024-07-26 | Stop reason: HOSPADM

## 2024-07-26 RX ORDER — ONDANSETRON 2 MG/ML
4 INJECTION INTRAMUSCULAR; INTRAVENOUS EVERY 30 MIN PRN
Status: DISCONTINUED | OUTPATIENT
Start: 2024-07-26 | End: 2024-07-30 | Stop reason: HOSPADM

## 2024-07-26 RX ORDER — FENTANYL CITRATE 50 UG/ML
50 INJECTION, SOLUTION INTRAMUSCULAR; INTRAVENOUS EVERY 5 MIN PRN
Status: DISCONTINUED | OUTPATIENT
Start: 2024-07-26 | End: 2024-07-26 | Stop reason: HOSPADM

## 2024-07-26 RX ORDER — NALOXONE HYDROCHLORIDE 0.4 MG/ML
0.1 INJECTION, SOLUTION INTRAMUSCULAR; INTRAVENOUS; SUBCUTANEOUS
Status: DISCONTINUED | OUTPATIENT
Start: 2024-07-26 | End: 2024-07-30 | Stop reason: HOSPADM

## 2024-07-26 RX ORDER — FENTANYL CITRATE 50 UG/ML
25-50 INJECTION, SOLUTION INTRAMUSCULAR; INTRAVENOUS
Status: DISCONTINUED | OUTPATIENT
Start: 2024-07-26 | End: 2024-07-26 | Stop reason: HOSPADM

## 2024-07-26 RX ORDER — DEXAMETHASONE SODIUM PHOSPHATE 10 MG/ML
4 INJECTION, SOLUTION INTRAMUSCULAR; INTRAVENOUS
Status: DISCONTINUED | OUTPATIENT
Start: 2024-07-26 | End: 2024-07-26 | Stop reason: HOSPADM

## 2024-07-26 RX ORDER — LIDOCAINE HYDROCHLORIDE 20 MG/ML
INJECTION, SOLUTION INFILTRATION; PERINEURAL PRN
Status: DISCONTINUED | OUTPATIENT
Start: 2024-07-26 | End: 2024-07-26

## 2024-07-26 RX ORDER — ACETAMINOPHEN 325 MG/1
975 TABLET ORAL ONCE
Status: DISCONTINUED | OUTPATIENT
Start: 2024-07-26 | End: 2024-07-26 | Stop reason: HOSPADM

## 2024-07-26 RX ORDER — KETOROLAC TROMETHAMINE 30 MG/ML
INJECTION, SOLUTION INTRAMUSCULAR; INTRAVENOUS PRN
Status: DISCONTINUED | OUTPATIENT
Start: 2024-07-26 | End: 2024-07-26

## 2024-07-26 RX ORDER — HYDROMORPHONE HYDROCHLORIDE 1 MG/ML
0.2 INJECTION, SOLUTION INTRAMUSCULAR; INTRAVENOUS; SUBCUTANEOUS EVERY 5 MIN PRN
Status: DISCONTINUED | OUTPATIENT
Start: 2024-07-26 | End: 2024-07-26 | Stop reason: HOSPADM

## 2024-07-26 RX ORDER — KETAMINE HYDROCHLORIDE 10 MG/ML
INJECTION INTRAMUSCULAR; INTRAVENOUS PRN
Status: DISCONTINUED | OUTPATIENT
Start: 2024-07-26 | End: 2024-07-26

## 2024-07-26 RX ORDER — CEFAZOLIN SODIUM 2 G/50ML
2 SOLUTION INTRAVENOUS SEE ADMIN INSTRUCTIONS
Status: DISCONTINUED | OUTPATIENT
Start: 2024-07-26 | End: 2024-07-26 | Stop reason: HOSPADM

## 2024-07-26 RX ORDER — LIDOCAINE 40 MG/G
CREAM TOPICAL
Status: DISCONTINUED | OUTPATIENT
Start: 2024-07-26 | End: 2024-07-26 | Stop reason: HOSPADM

## 2024-07-26 RX ORDER — FLUMAZENIL 0.1 MG/ML
0.2 INJECTION, SOLUTION INTRAVENOUS
Status: DISCONTINUED | OUTPATIENT
Start: 2024-07-26 | End: 2024-07-26 | Stop reason: HOSPADM

## 2024-07-26 RX ORDER — ONDANSETRON 4 MG/1
4 TABLET, ORALLY DISINTEGRATING ORAL EVERY 8 HOURS PRN
Qty: 4 TABLET | Refills: 0 | Status: SHIPPED | OUTPATIENT
Start: 2024-07-26 | End: 2024-09-09

## 2024-07-26 RX ORDER — NALOXONE HYDROCHLORIDE 0.4 MG/ML
0.1 INJECTION, SOLUTION INTRAMUSCULAR; INTRAVENOUS; SUBCUTANEOUS
Status: DISCONTINUED | OUTPATIENT
Start: 2024-07-26 | End: 2024-07-26 | Stop reason: HOSPADM

## 2024-07-26 RX ORDER — ONDANSETRON 2 MG/ML
4 INJECTION INTRAMUSCULAR; INTRAVENOUS EVERY 30 MIN PRN
Status: DISCONTINUED | OUTPATIENT
Start: 2024-07-26 | End: 2024-07-26 | Stop reason: HOSPADM

## 2024-07-26 RX ORDER — HYDROMORPHONE HYDROCHLORIDE 1 MG/ML
0.4 INJECTION, SOLUTION INTRAMUSCULAR; INTRAVENOUS; SUBCUTANEOUS EVERY 5 MIN PRN
Status: DISCONTINUED | OUTPATIENT
Start: 2024-07-26 | End: 2024-07-26 | Stop reason: HOSPADM

## 2024-07-26 RX ORDER — ONDANSETRON 4 MG/1
4 TABLET, ORALLY DISINTEGRATING ORAL EVERY 30 MIN PRN
Status: DISCONTINUED | OUTPATIENT
Start: 2024-07-26 | End: 2024-07-26 | Stop reason: HOSPADM

## 2024-07-26 RX ORDER — ONDANSETRON 4 MG/1
4 TABLET, ORALLY DISINTEGRATING ORAL EVERY 30 MIN PRN
Status: DISCONTINUED | OUTPATIENT
Start: 2024-07-26 | End: 2024-07-30 | Stop reason: HOSPADM

## 2024-07-26 RX ORDER — OXYCODONE HYDROCHLORIDE 5 MG/1
10 TABLET ORAL
Status: DISCONTINUED | OUTPATIENT
Start: 2024-07-26 | End: 2024-07-30 | Stop reason: HOSPADM

## 2024-07-26 RX ORDER — ACETAMINOPHEN 325 MG/1
650 TABLET ORAL EVERY 4 HOURS PRN
Qty: 50 TABLET | Refills: 0 | Status: SHIPPED | OUTPATIENT
Start: 2024-07-26

## 2024-07-26 RX ORDER — GLYCOPYRROLATE 0.2 MG/ML
INJECTION, SOLUTION INTRAMUSCULAR; INTRAVENOUS PRN
Status: DISCONTINUED | OUTPATIENT
Start: 2024-07-26 | End: 2024-07-26

## 2024-07-26 RX ORDER — DEXAMETHASONE SODIUM PHOSPHATE 10 MG/ML
4 INJECTION, SOLUTION INTRAMUSCULAR; INTRAVENOUS
Status: DISCONTINUED | OUTPATIENT
Start: 2024-07-26 | End: 2024-07-30 | Stop reason: HOSPADM

## 2024-07-26 RX ADMIN — Medication 0.5 MG: at 10:01

## 2024-07-26 RX ADMIN — FENTANYL CITRATE 50 MCG: 50 INJECTION, SOLUTION INTRAMUSCULAR; INTRAVENOUS at 08:07

## 2024-07-26 RX ADMIN — KETOROLAC TROMETHAMINE 30 MG: 30 INJECTION, SOLUTION INTRAMUSCULAR; INTRAVENOUS at 10:01

## 2024-07-26 RX ADMIN — PROPOFOL 200 MCG/KG/MIN: 10 INJECTION, EMULSION INTRAVENOUS at 08:35

## 2024-07-26 RX ADMIN — PROPOFOL 150 MCG/KG/MIN: 10 INJECTION, EMULSION INTRAVENOUS at 09:01

## 2024-07-26 RX ADMIN — FENTANYL CITRATE 50 MCG: 50 INJECTION, SOLUTION INTRAMUSCULAR; INTRAVENOUS at 08:51

## 2024-07-26 RX ADMIN — BUPIVACAINE HYDROCHLORIDE 20 ML: 5 INJECTION, SOLUTION EPIDURAL; INTRACAUDAL at 08:10

## 2024-07-26 RX ADMIN — OXYCODONE HYDROCHLORIDE 5 MG: 5 TABLET ORAL at 10:39

## 2024-07-26 RX ADMIN — LIDOCAINE HYDROCHLORIDE 100 MG: 20 INJECTION, SOLUTION INFILTRATION; PERINEURAL at 08:35

## 2024-07-26 RX ADMIN — Medication 0.5 MG: at 10:14

## 2024-07-26 RX ADMIN — GLYCOPYRROLATE 0.2 MG: 0.2 INJECTION, SOLUTION INTRAMUSCULAR; INTRAVENOUS at 08:31

## 2024-07-26 RX ADMIN — PROPOFOL 150 MCG/KG/MIN: 10 INJECTION, EMULSION INTRAVENOUS at 09:40

## 2024-07-26 RX ADMIN — ONDANSETRON 4 MG: 2 INJECTION INTRAMUSCULAR; INTRAVENOUS at 08:36

## 2024-07-26 RX ADMIN — SODIUM CHLORIDE, SODIUM LACTATE, POTASSIUM CHLORIDE, CALCIUM CHLORIDE: 600; 310; 30; 20 INJECTION, SOLUTION INTRAVENOUS at 07:51

## 2024-07-26 RX ADMIN — PROPOFOL 100 MG: 10 INJECTION, EMULSION INTRAVENOUS at 08:36

## 2024-07-26 RX ADMIN — CEFAZOLIN SODIUM 2 G: 2 SOLUTION INTRAVENOUS at 08:40

## 2024-07-26 RX ADMIN — DEXAMETHASONE SODIUM PHOSPHATE 4 MG: 4 INJECTION, SOLUTION INTRA-ARTICULAR; INTRALESIONAL; INTRAMUSCULAR; INTRAVENOUS; SOFT TISSUE at 08:37

## 2024-07-26 RX ADMIN — ACETAMINOPHEN 975 MG: 325 TABLET ORAL at 07:47

## 2024-07-26 RX ADMIN — KETAMINE HYDROCHLORIDE 50 MG: 10 INJECTION INTRAMUSCULAR; INTRAVENOUS at 09:11

## 2024-07-26 RX ADMIN — FENTANYL CITRATE 50 MCG: 50 INJECTION, SOLUTION INTRAMUSCULAR; INTRAVENOUS at 09:29

## 2024-07-26 RX ADMIN — PROPOFOL 200 MG: 10 INJECTION, EMULSION INTRAVENOUS at 08:35

## 2024-07-26 NOTE — ANESTHESIA PROCEDURE NOTES
Airway       Patient location during procedure: OR  Staff -        Performed By: CRNAIndications and Patient Condition       Indications for airway management: nichole-procedural       Induction type:intravenous       Mask difficulty assessment: 1 - vent by mask    Final Airway Details       Final airway type: supraglottic airway    Supraglottic Airway Details        Type: LMA       Brand: I-Gel       LMA size: 5    Post intubation assessment        Placement verified by: capnometry        Number of attempts at approach: 1       Number of other approaches attempted: 0       Secured with: tape       Ease of procedure: easy       Dentition: Intact and Unchanged

## 2024-07-26 NOTE — DISCHARGE INSTRUCTIONS
[facility]  Home Care Following Anesthesia  For 24 hours after surgery:  Get plenty of rest.  A responsible adult must stay with you for at least 24 hours after you leave the surgery center.  Do not drive or use heavy equipment.  If you have weakness or tingling, don't drive or use heavy equipment until this feeling goes away.   Do not drink alcohol.   Avoid strenuous or risky activities.  Ask for help when climbing stairs.  You may feel lightheaded.  IF so, sit for a few minutes before standing.  Have someone help you get up.   If you have nausea (feel sick to your stomach): Drink only clear liquids such as apple juice, ginger ale, broth or 7-Up.  Rest may also help.  Be sure to drink enough fluids.  Move to a regular diet as you feel able.   You may have a slight fever.  Call the doctor if your fever is over 100 F (37.7 C) (taken under the tongue) or lasts longer than 24 hours.  You may have a dry mouth, a sore throat, muscle aches or trouble sleeping. These should go away after 24 hours.  Do not make important or legal decisions.   It is recommended to avoid smoking.               Tips for taking pain medications  To get the best pain relief possible, remember these points:  Take pain medications as directed, before pain becomes severe.  Pain medication can upset your stomach: taking it with food may help.  Constipation is a common side effect of pain medication. Drink plenty of  fluids.  Eat foods high in fiber. Take a stool softener if recommended by your doctor or pharmacist.  Do not drink alcohol, drive or operate machinery while taking pain medications.  Ask about other ways to control pain, such as with heat, ice or relaxation.    Tylenol/Acetaminophen Consumption    If you feel your pain relief is insufficient, you may take Tylenol/Acetaminophen in addition to your narcotic pain medication.   Be careful not to exceed 4,000 mg of Tylenol/Acetaminophen in a 24 hour  period from all sources.  If you are taking extra strength Tylenol/acetaminophen (500 mg), the maximum dose is 8 tablets in 24 hours.  If you are taking regular strength acetaminophen (325 mg), the maximum dose is 12 tablets in 24 hours.    Call a doctor for any of the following:  Signs of infection (fever, growing tenderness at the surgery site, a large amount of drainage or bleeding, severe pain, foul-smelling drainage, redness, swelling).  It has been over 8 to 10 hours since surgery and you are still not able to urinate (pass water).  Headache for over 24 hours.  Numbness, tingling or weakness the day after surgery (if you had spinal anesthesia).  Signs of Covid-19 infection (temperature over 100 degrees, shortness of breath, cough, loss of taste/smell, generalized body aches, persistent headache, chills, sore throat, nausea/vomiting/diarrhea)  Your doctor is:       Dr. Cas Meza, Orthopaedics: [phone]               Or dial [phone] and ask for the resident on call for:  Orthopaedics  For emergency care, call the:  [facility] Emergency Department: [phone] (TTY for hearing impaired: [phone])

## 2024-07-26 NOTE — OP NOTE
PREOPERATIVE DIAGNOSIS:   Left ACL tear  Left medial meniscus tear    POSTOPERATIVE DIAGNOSIS:  Left ACL tear  Left medial meniscus tear    PROCEDURE:  Examination under anesthesia left Knee  left Knee arthroscopy  ACL reconstruction quad tendon autograft  Medial mensicus repair    DATE OF SURGERY: 7/26/2024    SURGEON: Cas Meza MD    ASSISTANT: Florecita Stone PA-C. The assistance of Mrs. Stone was necessary for positioning, arthroscopic visualization, retraction, graft preparation and graft passage. There was no qualified available resident or fellow who was aware of the intricies of the procedure and requirements of graft preparation.     RESIDENT OR FELLOW: Jose Miguel Pederson MD    OPERATIVE INDICATIONS: Chad M Johanson is a pleasant 43 year old who I saw through my orthopedic clinic with a history, physical, imaging consistent with left ACL tear and medial meniscus repair, I offered ACl reconstruction quad tendon autograft and mensicus surgery.  I reviewed with the patient the risks, benefits, complications, techniques and alternatives to surgery.  We reviewed the expected course of recovery and the potential expected outcomes.  The patient understood both the risks and benefits and desired to proceed despite the risks.    OPERATIVE DETAILS: In the preoperative area the patient's informed consent was reviewed and they desired to proceed.  The left leg was marked and the patient was in agreement.  The patient was taken to the operating room where a timeout was performed and all parties were in agreement.  Preoperative antibiotics were given within 1 hour of the time of incision.  The patient was placed in the supine position and surrendered to LMA anesthesia.  No tourniquet was applied.  Egg crate was placed beneath the well leg and a side post was utilized.  The operative leg was prepped and draped in the usual sterile fashion.     Examination under anesthesia: Range of motion 0-135, 1 quadrant medial and 2  quadrant lateral translation of the patella, stable to varus and valgus stress testing, stable posterior drawer testing, 2+ anterior drawer testing, 2B Lachman, 1+ pivot shift    Graft harvest and preparation: A 5 cm midline incision was made and hemostasis was insured with the Bovie electrocautery.  The was obtained and was quickly identified and the medial and lateral borders were confirmed.  We marked a section of tendon 10 mm in width from the superior pole of the patella.  We then used a small knife and we could reflect the tendon from its insertion on the superior pole of the patella and telemetry over the tendon and a grasping suture was placed to allow control of the tendon.  With care taken to maintain parallel incisions a 10 mm x 70 mm section of tendon was then harvested we then used then transected the proximal aspect of the tendon once we had adequate excursion.  A side-to-side repair was then completed with #1 Vicryl suture.    The graft was taken to the back table where it was fashioned a fibertag was affixed to both ends.  Once this was sutured in place it was tensioned at 20 pounds for 20 minutes to remove any creep within the graft.  The final graft dimensions measured 10 by 70 mm in length. It was soaked in vancomycin solution until it was ready for graft passage.    Anterior medial and anterior lateral arthroscopic portals were created and a diagnostic arthroscopy was performed with the following findings: The medial patella facet, lateral patella facet, central ridge of the patella showed normal cartilage.  The trochlear cartilage was normal.  The medial femoral condyle showed normal cartilage and medial tibial plateau showed normal cartilage. The lateral femoral condyle showed normal cartilage and lateral tibial plateau showed normal. The Medial meniscus showed a tear of the undersurface that was unstable to probing and Lateral Meniscus was intact.  There was a grade 3 rupture of the anterior  cruciate ligament with positive empty wall sign.  The PCL was intact.  There was no opening to varus and valgus stress testing in either the lateral or medial compartments, respectively.    A debridement of the nonfunctioning ACL was then performed until we could visualize the anatomic insertion sites of the ACL on both the femoral and the tibial origin.  Remnant preservation was pursued in the tibial side and the tibial tunnel was centered midway between the medial and lateral tibial spines in line with the posterior aspect of the anterior horn of the lateral meniscus.    The arthroscope was placed into the medial portal and a 6-9 guide was placed into the lateral portal we selected our position along the lateral femoral wall.  The osseous length measured 38 mm.  A 10 mm flip cutter was then introduced through the lateral wall and a 25 mm socket was reamed.  The flip cutter was placed into the straight position and was replaced with a fiber loop suture.  Arthroscopic visualization showed excellent position of the femoral tunnel.  Excess bone from the reamings was then removed arthroscopically.    The arthroscope was then returned to the lateral portal, a tip to tip guide was introduced.  Our osseous length measured 40 mm.  The flip cutter was placed and a 10x30 mm socket was reamed.  The flip cutter was then returned to the straight position and a fiber loop passing suture was placed.    At this time rasping along the meniscal synovial junction was completed.  We then placed a series of 3, Arthrex all inside fiber stitch anchors in the posterior horn mid body of the medial meniscus.  At the conclusion of placing the sutures there is no further instability to probing.    The medial portal was enlarged.  We confirm that there were no soft tissue bridges.  The graft was brought up onto the field reduced to the medial portal.  The cortical button was deployed over the lateral cortex.  Approximately 20 mm of graft was  "then reduced into the femoral tunnel.  The remainder of the graft was reduced in the tibia.  We then \"balanced\" the graft within the knee joint with 20 mm of graft in the femoral side, 20 mm of graft in the tibial side.  Tensioning and retensioning was then performed in full extension.  Final knot-tying was performed on the tibia and the femoral side.  Examination showed Lachman of 0, no pivot shift. Final arthroscopic images showed good position of the graft, good tension to probing, clearance along the roof of the intercondylar notch in terminal extension clearance along the lateral wall and PCL in flexion.    Copious irrigation was performed an a layered closure was initiated, sterile dressings were applied and the patient was transferred to the recovery room in stable condition with stable vital signs.    ESTIMATED BLOOD LOSS: 25 mL.    TOURNIQUET TIME: No tourniquet was placed.    COMPLICATIONS: None apparent.    DRAINS: None.    SPECIMENS: None.     POSTOPERATIVE PLAN:  Weightbearing as tolerated with hinged knee brace locked in full extension x 4 weeks  No motion x 1 week, then range of motion 0-90 degrees when sitting or therapy  At 4 weeks begin WBAT with brace on and unlocked until 6 weeks  Hinged knee brace on and locked when up and around, off or unlocked for sitting or therapy  OK for motion with PT or during home pt sessions during first week  Wean from crutches when able, at 4 weeks WBAT with brace on and unlocked, then wean at 6 weeks  No running until 3 months  No sports until 6 months, return to game competition at 7-10 months  Shower on day 3  Start physical therapy day 3-5   daily x 4 weeks  "

## 2024-07-26 NOTE — PROGRESS NOTES
Patient received left side Saphenous nerve block  without Exparel, performed by Dr. Clay.  Fentanyl 50mcg and Versed 2mg given. Tolerated procedure well.  Neva Gastelum RN on 7/26/2024 at 8:28 AM

## 2024-07-26 NOTE — ANESTHESIA POSTPROCEDURE EVALUATION
Patient: Chad M Johanson    Procedure: Procedure(s):  left knee examination under anesthesia, knee arthroscopy, anterior cruciate ligament reconstruction with quadricepts tendon autograft,  MEDIAL MENISCUS REPAIR       Anesthesia Type:  General    Note:  Disposition: Outpatient   Postop Pain Control: Uneventful            Sign Out: Well controlled pain   PONV: No   Neuro/Psych: Uneventful            Sign Out: Acceptable/Baseline neuro status   Airway/Respiratory: Uneventful            Sign Out: Acceptable/Baseline resp. status   CV/Hemodynamics: Uneventful            Sign Out: Acceptable CV status; No obvious hypovolemia; No obvious fluid overload   Other NRE: NONE   DID A NON-ROUTINE EVENT OCCUR?            Last vitals:  Vitals Value Taken Time   /94 07/26/24 1030   Temp 36.5  C (97.7  F) 07/26/24 1022   Pulse 84 07/26/24 1037   Resp 20 07/26/24 1037   SpO2 92 % 07/26/24 1037   Vitals shown include unfiled device data.    Electronically Signed By: Keith Clay MD  July 26, 2024  10:37 AM

## 2024-07-26 NOTE — ANESTHESIA PROCEDURE NOTES
"Adductor canal Procedure Note    Pre-Procedure   Staff -        Anesthesiologist:  Keith Clay MD       Performed By: anesthesiologist       Location: pre-op       Procedure Start/Stop Times: 7/26/2024 8:10 AM and 7/26/2024 8:15 AM       Pre-Anesthestic Checklist: patient identified, IV checked, site marked, risks and benefits discussed, informed consent, monitors and equipment checked, pre-op evaluation, at physician/surgeon's request and post-op pain management  Timeout:       Correct Patient: Yes        Correct Procedure: Yes        Correct Site: Yes        Correct Position: Yes        Correct Laterality: Yes        Site Marked: Yes  Procedure Documentation  Procedure: Adductor canal       Laterality: left       Patient Position: supine          Catheter threaded easily.             Ultrasound guided       1. Ultrasound was used to identify targeted nerve, plexus, vascular marker, or fascial plane and place a needle adjacent to it in real-time.       2. Ultrasound was used to visualize the spread of anesthetic in close proximity to the above referenced structure.       3. A permanent image is entered into the patient's record.       4. The visualized anatomic structures appeared normal.       5. There were no apparent abnormal pathologic findings.    Assessment/Narrative         The placement was negative for: blood aspirated, painful injection and site bleeding       Paresthesias: No.    Medication(s) Administered   Bupivacaine 0.5% PF (Infiltration) - Infiltration   20 mL - 7/26/2024 8:10:00 AM  Medication Administration Time: 7/26/2024 8:10 AM     Comments:  Bupivacaine 0.5% 20 ml       FOR [facility] ([facility]) ONLY:   Pain Team Contact information: please page the Pain Team Via FRESS. Search \"Pain\". During daytime hours, please page the attending first. At night please page the resident first.      "

## 2024-07-26 NOTE — ANESTHESIA CARE TRANSFER NOTE
Patient: Chad M Johanson    Procedure: Procedure(s):  left knee examination under anesthesia, knee arthroscopy, anterior cruciate ligament reconstruction with quadricepts tendon autograft,  MEDIAL MENISCUS REPAIR       Diagnosis: Chronic pain of left knee [M25.562, G89.29]  Diagnosis Additional Information: No value filed.    Anesthesia Type:   General     Note:    Oropharynx: oropharynx clear of all foreign objects and spontaneously breathing  Level of Consciousness: awake  Oxygen Supplementation: room air    Independent Airway: airway patency satisfactory and stable  Dentition: dentition unchanged  Vital Signs Stable: post-procedure vital signs reviewed and stable  Report to RN Given: handoff report given  Patient transferred to: PACU  Comments: VSS and WNL, comfortable, no PONV, report to Darshana HARTLEY  Handoff Report: Identifed the Patient, Identified the Reponsible Provider, Reviewed the pertinent medical history, Discussed the surgical course, Reviewed Intra-OP anesthesia mangement and issues during anesthesia, Set expectations for post-procedure period and Allowed opportunity for questions and acknowledgement of understanding      Vitals:  Vitals Value Taken Time   BP     Temp     Pulse     Resp 22 07/26/24 1022   SpO2 93 % 07/26/24 1022   Vitals shown include unfiled device data.    Electronically Signed By: DI Herbert CRNA  July 26, 2024  10:23 AM

## 2024-07-30 ENCOUNTER — MYC REFILL (OUTPATIENT)
Dept: ORTHOPEDICS | Facility: CLINIC | Age: 44
End: 2024-07-30
Payer: COMMERCIAL

## 2024-07-30 DIAGNOSIS — M25.562 CHRONIC PAIN OF LEFT KNEE: ICD-10-CM

## 2024-07-30 DIAGNOSIS — G89.29 CHRONIC PAIN OF LEFT KNEE: ICD-10-CM

## 2024-07-30 RX ORDER — OXYCODONE HYDROCHLORIDE 5 MG/1
5 TABLET ORAL EVERY 6 HOURS PRN
Qty: 20 TABLET | Refills: 0 | Status: SHIPPED | OUTPATIENT
Start: 2024-07-30 | End: 2024-08-06

## 2024-07-30 ASSESSMENT — ACTIVITIES OF DAILY LIVING (ADL)
LIMPING: THE SYMPTOM AFFECTS MY ACTIVITY SEVERELY
RISE FROM A CHAIR: ACTIVITY IS FAIRLY DIFFICULT
RISE FROM A CHAIR: ACTIVITY IS FAIRLY DIFFICULT
AS_A_RESULT_OF_YOUR_KNEE_INJURY,_HOW_WOULD_YOU_RATE_YOUR_CURRENT_LEVEL_OF_DAILY_ACTIVITY?: SEVERELY ABNORMAL
STIFFNESS: THE SYMPTOM AFFECTS MY ACTIVITY SEVERELY
PAIN: THE SYMPTOM AFFECTS MY ACTIVITY SEVERELY
GO DOWN STAIRS: ACTIVITY IS VERY DIFFICULT
STIFFNESS: THE SYMPTOM AFFECTS MY ACTIVITY SEVERELY
PLEASE_INDICATE_YOR_PRIMARY_REASON_FOR_REFERRAL_TO_THERAPY:: KNEE
GIVING WAY, BUCKLING OR SHIFTING OF KNEE: THE SYMPTOM AFFECTS MY ACTIVITY SEVERELY
SIT WITH YOUR KNEE BENT: ACTIVITY IS VERY DIFFICULT
AS_A_RESULT_OF_YOUR_KNEE_INJURY,_HOW_WOULD_YOU_RATE_YOUR_CURRENT_LEVEL_OF_DAILY_ACTIVITY?: SEVERELY ABNORMAL
WEAKNESS: THE SYMPTOM AFFECTS MY ACTIVITY SEVERELY
KNEEL ON THE FRONT OF YOUR KNEE: I AM UNABLE TO DO THE ACTIVITY
KNEE_ACTIVITY_OF_DAILY_LIVING_SUM: 13
GO UP STAIRS: ACTIVITY IS VERY DIFFICULT
KNEEL ON THE FRONT OF YOUR KNEE: I AM UNABLE TO DO THE ACTIVITY
STAND: ACTIVITY IS VERY DIFFICULT
GIVING WAY, BUCKLING OR SHIFTING OF KNEE: THE SYMPTOM AFFECTS MY ACTIVITY SEVERELY
SIT WITH YOUR KNEE BENT: ACTIVITY IS VERY DIFFICULT
KNEE_ACTIVITY_OF_DAILY_LIVING_SCORE: 18.57
GO DOWN STAIRS: ACTIVITY IS VERY DIFFICULT
HOW_WOULD_YOU_RATE_THE_CURRENT_FUNCTION_OF_YOUR_KNEE_DURING_YOUR_USUAL_DAILY_ACTIVITIES_ON_A_SCALE_FROM_0_TO_100_WITH_100_BEING_YOUR_LEVEL_OF_KNEE_FUNCTION_PRIOR_TO_YOUR_INJURY_AND_0_BEING_THE_INABILITY_TO_PERFORM_ANY_OF_YOUR_USUAL_DAILY_ACTIVITIES?: 10
LIMPING: THE SYMPTOM AFFECTS MY ACTIVITY SEVERELY
SQUAT: I AM UNABLE TO DO THE ACTIVITY
WEAKNESS: THE SYMPTOM AFFECTS MY ACTIVITY SEVERELY
RAW_SCORE: 13
SWELLING: THE SYMPTOM AFFECTS MY ACTIVITY SEVERELY
WALK: ACTIVITY IS VERY DIFFICULT
STAND: ACTIVITY IS VERY DIFFICULT
PAIN: THE SYMPTOM AFFECTS MY ACTIVITY SEVERELY
SWELLING: THE SYMPTOM AFFECTS MY ACTIVITY SEVERELY
HOW_WOULD_YOU_RATE_THE_OVERALL_FUNCTION_OF_YOUR_KNEE_DURING_YOUR_USUAL_DAILY_ACTIVITIES?: SEVERELY ABNORMAL
HOW_WOULD_YOU_RATE_THE_OVERALL_FUNCTION_OF_YOUR_KNEE_DURING_YOUR_USUAL_DAILY_ACTIVITIES?: SEVERELY ABNORMAL
WALK: ACTIVITY IS VERY DIFFICULT
GO UP STAIRS: ACTIVITY IS VERY DIFFICULT
SQUAT: I AM UNABLE TO DO THE ACTIVITY
HOW_WOULD_YOU_RATE_THE_CURRENT_FUNCTION_OF_YOUR_KNEE_DURING_YOUR_USUAL_DAILY_ACTIVITIES_ON_A_SCALE_FROM_0_TO_100_WITH_100_BEING_YOUR_LEVEL_OF_KNEE_FUNCTION_PRIOR_TO_YOUR_INJURY_AND_0_BEING_THE_INABILITY_TO_PERFORM_ANY_OF_YOUR_USUAL_DAILY_ACTIVITIES?: 10

## 2024-07-30 NOTE — TELEPHONE ENCOUNTER
S/p Left knee arthroscopic EUA, ACL reconstruction quad tendon autograft, medial mensicus repair, DOS: 7/26/24    oxyCODONE (ROXICODONE) 5 MG tablet (1-2 tabs q4hr PRN)  Last Written Prescription Date:  7/26/24  Last Fill Quantity: 20,   # refills: 0  Future Office visit: 8/6/24 with ELICIA Bernabe RNCC

## 2024-07-31 ENCOUNTER — THERAPY VISIT (OUTPATIENT)
Dept: PHYSICAL THERAPY | Facility: CLINIC | Age: 44
End: 2024-07-31
Attending: ORTHOPAEDIC SURGERY
Payer: COMMERCIAL

## 2024-07-31 DIAGNOSIS — Z98.890 S/P MEDIAL MENISCUS REPAIR OF LEFT KNEE: ICD-10-CM

## 2024-07-31 DIAGNOSIS — Z98.890 S/P ARTHROSCOPIC RECONSTRUCTION OF ACL OF LEFT KNEE USING QUADRICEPS TENDON AUTOGRAFT: ICD-10-CM

## 2024-07-31 DIAGNOSIS — M25.562 CHRONIC PAIN OF LEFT KNEE: ICD-10-CM

## 2024-07-31 DIAGNOSIS — Z87.39 S/P ARTHROSCOPIC RECONSTRUCTION OF ACL OF LEFT KNEE USING QUADRICEPS TENDON AUTOGRAFT: ICD-10-CM

## 2024-07-31 DIAGNOSIS — M25.562 LEFT KNEE PAIN: Primary | ICD-10-CM

## 2024-07-31 DIAGNOSIS — G89.29 CHRONIC PAIN OF LEFT KNEE: ICD-10-CM

## 2024-07-31 PROCEDURE — 97110 THERAPEUTIC EXERCISES: CPT | Mod: GP | Performed by: PHYSICAL THERAPIST

## 2024-07-31 PROCEDURE — 97530 THERAPEUTIC ACTIVITIES: CPT | Mod: GP | Performed by: PHYSICAL THERAPIST

## 2024-07-31 PROCEDURE — 97161 PT EVAL LOW COMPLEX 20 MIN: CPT | Mod: GP | Performed by: PHYSICAL THERAPIST

## 2024-07-31 NOTE — PROGRESS NOTES
PHYSICAL THERAPY EVALUATION  Type of Visit: Evaluation              Subjective   At the end of April, pt was on trampoline with his kid and landed awkwardly while trying to avoid landing on his son. Felt/heard a pop. Did not have any knee issues prior to this injury. Since injury up until surgery he had still been working as an , about 15k+ steps a day. Endorses the pain was getting worse and he has not been able to bend or straighten his knee all of the way for a couple of months.       Presenting condition or subjective complaint: I just had ACL reconstruction surgery and a meniscus tear  Date of onset: 07/26/24 (DOS)    Relevant medical history: Arthritis; Pain at night or rest   Dates & types of surgery: Shoulder in 2004, appendectomy in 2022, and lymphnode removal in 1996    Prior diagnostic imaging/testing results: MRI; X-ray     MRI:  Vertical posterior horn medial meniscal tear with  increased signal in the meniscocapsular junction.  Lateral meniscus: Intact.    Cruciate ligaments: High-grade partial-thickness tear of the proximal  anterior cruciate ligament; intact posterior cruciate ligament.  Low-grade anterior translation of the knee.  Prior therapy history for the same diagnosis, illness or injury: No      Prior Level of Function  Transfers: Independent  Ambulation: Independent  ADL: Independent  IADL:     Living Environment  Social support: With a significant other or spouse   Type of home: House; 1 level; Basement   Stairs to enter the home: Yes 3 Is there a railing: Yes     Ramp: No   Stairs inside the home: Yes 10 Is there a railing: Yes     Help at home: None; Other  Equipment owned: Walker with wheels; Crutches     Employment: Yes   Hobbies/Interests:      Patient goals for therapy: Get back to normal    Pain assessment: See objective evaluation for additional pain details     Per Dr. Meza on 7/26/24:  POSTOPERATIVE PLAN:  Weightbearing as tolerated with hinged knee brace  locked in full extension x 4 weeks  No motion x 1 week, then range of motion 0-90 degrees when sitting or therapy  At 4 weeks begin WBAT with brace on and unlocked until 6 weeks  Hinged knee brace on and locked when up and around, off or unlocked for sitting or therapy  OK for motion with PT or during home pt sessions during first week  Wean from crutches when able, at 4 weeks WBAT with brace on and unlocked, then wean at 6 weeks  No running until 3 months  No sports until 6 months, return to game competition at 7-10 months  Shower on day 3  Start physical therapy day 3-5    Objective   KNEE EVALUATION  PAIN: Pain Level at Rest: 5/10  Pain Level with Use: 7/10  Pain Location: knee  Pain Quality: Aching  Pain Frequency: constant  Pain is Worst: no pattern with time of day  Pain is Exacerbated By: walking, transfers, moving  Pain is Relieved By: cold, NSAIDs, and meds  Pain Progression: Unchanged  INTEGUMENTARY (edema, incisions):  not assessed today d/t wrap in place. Edema as expected s/p ACL-R  POSTURE: WNL  GAIT:  Weightbearing Status: WBAT  Assistive Device(s): Crutches (bilateral)  Gait Deviations:  ambulates into clinic with HKB locked in ext, B crutches and NWB on L LE  BALANCE/PROPRIOCEPTION:  not assessed today  WEIGHTBEARING ALIGNMENT:   NON-WEIGHTBEARING ALIGNMENT:   ROM:   (Degrees) Left AAROM Left PROM  Right AROM Right PROM   Knee Flexion 80  WNL    Knee Extension 15  WNL    Pain: throughout entire range, increased at end ranges    STRENGTH:  fair L quad set with max cues. Able to lift leg onto plinth independently. R quad WNL    Assessment & Plan   CLINICAL IMPRESSIONS  Medical Diagnosis: s/p L ACL-R w/quad tendon autograft, medial mensicus repair    Treatment Diagnosis: s/p L ACL-R w/quad tendon autograft, medial mensicus repair   Impression/Assessment: Patient is a 43 year old male with s/p L ACL-R, medial meniscus repair complaints.  The following significant findings have been identified: Pain,  Decreased ROM/flexibility, Decreased joint mobility, Decreased strength, Impaired balance, Decreased proprioception, Edema, Impaired gait, Impaired muscle performance, and Decreased activity tolerance. These impairments interfere with their ability to perform self care tasks, work tasks, recreational activities, household chores, driving , household mobility, and community mobility as compared to previous level of function.     Clinical Decision Making (Complexity):  Clinical Presentation: Stable/Uncomplicated  Clinical Presentation Rationale: based on medical and personal factors listed in PT evaluation  Clinical Decision Making (Complexity): Low complexity    PLAN OF CARE  Treatment Interventions:  Interventions: Gait Training, Manual Therapy, Neuromuscular Re-education, Therapeutic Activity, Therapeutic Exercise    Long Term Goals     PT Goal 1  Goal Identifier: Stairs  Goal Description: Pt will be able to ascend/descend stairs reciprocally with no AD  Rationale: to maximize safety and independence with performance of ADLs and functional tasks;to maximize safety and independence within the home;to maximize safety and independence within the community  Goal Progress: Pt has not done stairs yet, ambulates with B crutches  Target Date: 09/25/24      Frequency of Treatment: 2x/week  Duration of Treatment: 6-8 weeks    Recommended Referrals to Other Professionals:   Education Assessment:   Learner/Method: Patient    Risks and benefits of evaluation/treatment have been explained.   Patient/Family/caregiver agrees with Plan of Care.     Evaluation Time:     PT Eval, Low Complexity Minutes (73224): 10       Signing Clinician: Isabell Salinas, PT, DPT, OCS

## 2024-08-05 ENCOUNTER — THERAPY VISIT (OUTPATIENT)
Dept: PHYSICAL THERAPY | Facility: CLINIC | Age: 44
End: 2024-08-05
Attending: ORTHOPAEDIC SURGERY
Payer: COMMERCIAL

## 2024-08-05 DIAGNOSIS — Z98.890 S/P MEDIAL MENISCUS REPAIR OF LEFT KNEE: ICD-10-CM

## 2024-08-05 DIAGNOSIS — Z87.39 S/P ARTHROSCOPIC RECONSTRUCTION OF ACL OF LEFT KNEE USING QUADRICEPS TENDON AUTOGRAFT: ICD-10-CM

## 2024-08-05 DIAGNOSIS — Z98.890 S/P ARTHROSCOPIC RECONSTRUCTION OF ACL OF LEFT KNEE USING QUADRICEPS TENDON AUTOGRAFT: ICD-10-CM

## 2024-08-05 DIAGNOSIS — M25.562 LEFT KNEE PAIN: Primary | ICD-10-CM

## 2024-08-05 PROCEDURE — 97110 THERAPEUTIC EXERCISES: CPT | Mod: GP | Performed by: PHYSICAL THERAPIST

## 2024-08-05 PROCEDURE — 97530 THERAPEUTIC ACTIVITIES: CPT | Mod: GP | Performed by: PHYSICAL THERAPIST

## 2024-08-06 ENCOUNTER — OFFICE VISIT (OUTPATIENT)
Dept: ORTHOPEDICS | Facility: CLINIC | Age: 44
End: 2024-08-06
Payer: COMMERCIAL

## 2024-08-06 DIAGNOSIS — G89.29 CHRONIC PAIN OF LEFT KNEE: ICD-10-CM

## 2024-08-06 DIAGNOSIS — M25.562 CHRONIC PAIN OF LEFT KNEE: ICD-10-CM

## 2024-08-06 PROCEDURE — 99024 POSTOP FOLLOW-UP VISIT: CPT

## 2024-08-06 RX ORDER — OXYCODONE HYDROCHLORIDE 5 MG/1
5 TABLET ORAL EVERY 6 HOURS PRN
Qty: 20 TABLET | Refills: 0 | Status: SHIPPED | OUTPATIENT
Start: 2024-08-06 | End: 2024-09-09

## 2024-08-06 NOTE — LETTER
8/6/2024      Chad M Johanson  [address] [address]      Dear Colleague,    Thank you for referring your patient, Chad M Johanson, to the [facility] [place]. Please see a copy of my visit note below.    Chief Complaint:   Follow up, DOS 7/26/24 with Dr. Meza    Procedures:  Examination under anesthesia left Knee  left Knee arthroscopy  ACL reconstruction quad tendon autograft  Medial mensicus repair    History:  Chad M Johanson is a 43 year old patient s/p above procedure, here for follow up. He has done well since surgery, pain is controlled with tylenol and ibuprofen during the day, taking 5 mg oxycodone in the evening. Working with physical therapy, reports best ROM 0-80 degrees. Has remained in HKB while up. Taking ASA for DVT prophylaxis as prescribed. No specific concerns today.       Exam:     General: Awake, Alert, and oriented. Articulates and communicates with a normal affect     Left Lower Extremity:  Incisions well healed without evidence of infection, no erythema, drainage, or wound dehiscence   Normal post-operative effusion and ecchymosis  Range of motion and stability exam not performed  TA/Gsc/EHL/FHL with 5/5 strength  Distal pulses palpable, toes are warm and well perfused   Gait: Use of 2 crutches     Imaging:  No new imaging     Medications:     Current Outpatient Medications:      acetaminophen (TYLENOL) 325 MG tablet, Take 2 tablets (650 mg) by mouth every 4 hours as needed for mild pain, Disp: 50 tablet, Rfl: 0     aspirin 81 MG EC tablet, Take 2 tablets (162 mg) by mouth daily for 28 days, Disp: 56 tablet, Rfl: 0     hydrOXYzine HCl (ATARAX) 25 MG tablet, Take 1 tablet (25 mg) by mouth 3 times daily as needed for itching, Disp: 20 tablet, Rfl: 0     meloxicam (MOBIC) 15 MG tablet, Take 1 tablet (15 mg) by mouth daily as needed for moderate pain, Disp: 30 tablet, Rfl: 0     oxyCODONE (ROXICODONE) 5 MG tablet, Take 1 tablet (5 mg) by mouth  every 6 hours as needed for moderate to severe pain, Disp: 20 tablet, Rfl: 0     senna-docusate (SENOKOT-S/PERICOLACE) 8.6-50 MG tablet, Take 1-2 tablets by mouth 2 times daily, Disp: 30 tablet, Rfl: 0     sildenafil (VIAGRA) 100 MG tablet, Take 0.5-1 tablets ( mg) by mouth daily as needed, Disp: 30 tablet, Rfl: 3     ondansetron (ZOFRAN ODT) 4 MG ODT tab, Take 1 tablet (4 mg) by mouth every 8 hours as needed for nausea (Patient not taking: Reported on 8/6/2024), Disp: 4 tablet, Rfl: 0    Current Facility-Administered Medications:      lidocaine (PF) (XYLOCAINE) 1 % injection 3 mL, 3 mL, , , , 3 mL at 04/25/24 1716     triamcinolone (KENALOG-40) injection 40 mg, 40 mg, , , , 40 mg at 04/25/24 1716    Assessment:  Doing well 1.5 weeks s/p above procedures with Dr. Meza. Basic wound cares were performed today, I did not appreciate signs of infection. We discussed the plan below, all questions were answered to the best of my ability.     Plan:   -Weight bearing: WBAT with HKB on and locked while up   -Range of motion 0-90 degrees   -Brace: HKB on and locked while up, ok to remove or unlock brace while sitting or with therapy. Ok to unlock brace while up at the 4 week isatu   -Continue work with physical therapy   -DVT prophylaxis:  mg daily x 4 weeks   -Follow up: 6 weeks with Dr. Susana Stone PA-C 8/6/2024 3:14 PM  Orthopedic Surgery          Again, thank you for allowing me to participate in the care of your patient.        Sincerely,        ADAMARIS FITCH PA-C

## 2024-08-06 NOTE — NURSING NOTE
Reason For Visit:   Chief Complaint   Patient presents with    Surgical Followup     1 week post op, DOS 7/26/24, left knee examination under anesthesia, knee arthroscopy, anterior cruciate ligament reconstruction with quadricepts tendon autograft, meniscus surgery (Left) with Dr. Meza        There were no vitals taken for this visit.    Pain Assessment  Patient Currently in Pain: Yes  0-10 Pain Scale: 4  Primary Pain Location: Knee (left)    Angela Brandon ATC

## 2024-08-06 NOTE — PROGRESS NOTES
Chief Complaint:   Follow up, DOS 7/26/24 with Dr. Meza    Procedures:  Examination under anesthesia left Knee  left Knee arthroscopy  ACL reconstruction quad tendon autograft  Medial mensicus repair    History:  Chad M Johanson is a 43 year old patient s/p above procedure, here for follow up. He has done well since surgery, pain is controlled with tylenol and ibuprofen during the day, taking 5 mg oxycodone in the evening. Working with physical therapy, reports best ROM 0-80 degrees. Has remained in HKB while up. Taking ASA for DVT prophylaxis as prescribed. No specific concerns today.       Exam:     General: Awake, Alert, and oriented. Articulates and communicates with a normal affect     Left Lower Extremity:  Incisions well healed without evidence of infection, no erythema, drainage, or wound dehiscence   Normal post-operative effusion and ecchymosis  Range of motion and stability exam not performed  TA/Gsc/EHL/FHL with 5/5 strength  Distal pulses palpable, toes are warm and well perfused   Gait: Use of 2 crutches     Imaging:  No new imaging     Medications:     Current Outpatient Medications:     acetaminophen (TYLENOL) 325 MG tablet, Take 2 tablets (650 mg) by mouth every 4 hours as needed for mild pain, Disp: 50 tablet, Rfl: 0    aspirin 81 MG EC tablet, Take 2 tablets (162 mg) by mouth daily for 28 days, Disp: 56 tablet, Rfl: 0    hydrOXYzine HCl (ATARAX) 25 MG tablet, Take 1 tablet (25 mg) by mouth 3 times daily as needed for itching, Disp: 20 tablet, Rfl: 0    meloxicam (MOBIC) 15 MG tablet, Take 1 tablet (15 mg) by mouth daily as needed for moderate pain, Disp: 30 tablet, Rfl: 0    oxyCODONE (ROXICODONE) 5 MG tablet, Take 1 tablet (5 mg) by mouth every 6 hours as needed for moderate to severe pain, Disp: 20 tablet, Rfl: 0    senna-docusate (SENOKOT-S/PERICOLACE) 8.6-50 MG tablet, Take 1-2 tablets by mouth 2 times daily, Disp: 30 tablet, Rfl: 0    sildenafil (VIAGRA) 100 MG tablet, Take 0.5-1 tablets  ( mg) by mouth daily as needed, Disp: 30 tablet, Rfl: 3    ondansetron (ZOFRAN ODT) 4 MG ODT tab, Take 1 tablet (4 mg) by mouth every 8 hours as needed for nausea (Patient not taking: Reported on 8/6/2024), Disp: 4 tablet, Rfl: 0    Current Facility-Administered Medications:     lidocaine (PF) (XYLOCAINE) 1 % injection 3 mL, 3 mL, , , , 3 mL at 04/25/24 1716    triamcinolone (KENALOG-40) injection 40 mg, 40 mg, , , , 40 mg at 04/25/24 1716    Assessment:  Doing well 1.5 weeks s/p above procedures with Dr. Meza. Basic wound cares were performed today, I did not appreciate signs of infection. We discussed the plan below, all questions were answered to the best of my ability.     Plan:   -Weight bearing: WBAT with HKB on and locked while up   -Range of motion 0-90 degrees   -Brace: HKB on and locked while up, ok to remove or unlock brace while sitting or with therapy. Ok to unlock brace while up at the 4 week isatu   -Continue work with physical therapy   -DVT prophylaxis:  mg daily x 4 weeks   -Follow up: 6 weeks with Dr. Susana Stone PA-C 8/6/2024 3:14 PM  Orthopedic Surgery

## 2024-08-09 ENCOUNTER — THERAPY VISIT (OUTPATIENT)
Dept: PHYSICAL THERAPY | Facility: CLINIC | Age: 44
End: 2024-08-09
Payer: COMMERCIAL

## 2024-08-09 DIAGNOSIS — M25.562 ACUTE PAIN OF LEFT KNEE: Primary | ICD-10-CM

## 2024-08-09 DIAGNOSIS — Z87.39 S/P ARTHROSCOPIC RECONSTRUCTION OF ACL OF LEFT KNEE USING QUADRICEPS TENDON AUTOGRAFT: ICD-10-CM

## 2024-08-09 DIAGNOSIS — Z98.890 S/P MEDIAL MENISCUS REPAIR OF LEFT KNEE: ICD-10-CM

## 2024-08-09 DIAGNOSIS — Z98.890 S/P ARTHROSCOPIC RECONSTRUCTION OF ACL OF LEFT KNEE USING QUADRICEPS TENDON AUTOGRAFT: ICD-10-CM

## 2024-08-09 PROCEDURE — 97140 MANUAL THERAPY 1/> REGIONS: CPT | Mod: GP

## 2024-08-09 PROCEDURE — 97110 THERAPEUTIC EXERCISES: CPT | Mod: GP

## 2024-08-20 ENCOUNTER — THERAPY VISIT (OUTPATIENT)
Dept: PHYSICAL THERAPY | Facility: CLINIC | Age: 44
End: 2024-08-20
Payer: COMMERCIAL

## 2024-08-20 DIAGNOSIS — Z98.890 S/P MEDIAL MENISCUS REPAIR OF LEFT KNEE: ICD-10-CM

## 2024-08-20 DIAGNOSIS — Z98.890 S/P ARTHROSCOPIC RECONSTRUCTION OF ACL OF LEFT KNEE USING QUADRICEPS TENDON AUTOGRAFT: ICD-10-CM

## 2024-08-20 DIAGNOSIS — M25.562 ACUTE PAIN OF LEFT KNEE: Primary | ICD-10-CM

## 2024-08-20 DIAGNOSIS — Z87.39 S/P ARTHROSCOPIC RECONSTRUCTION OF ACL OF LEFT KNEE USING QUADRICEPS TENDON AUTOGRAFT: ICD-10-CM

## 2024-08-20 PROCEDURE — 97140 MANUAL THERAPY 1/> REGIONS: CPT | Mod: GP

## 2024-08-20 PROCEDURE — 97112 NEUROMUSCULAR REEDUCATION: CPT | Mod: GP

## 2024-08-20 PROCEDURE — 97110 THERAPEUTIC EXERCISES: CPT | Mod: GP

## 2024-08-22 ENCOUNTER — THERAPY VISIT (OUTPATIENT)
Dept: PHYSICAL THERAPY | Facility: CLINIC | Age: 44
End: 2024-08-22
Payer: COMMERCIAL

## 2024-08-22 DIAGNOSIS — M25.562 LEFT KNEE PAIN, UNSPECIFIED CHRONICITY: Primary | ICD-10-CM

## 2024-08-22 DIAGNOSIS — Z98.890 S/P MEDIAL MENISCUS REPAIR OF LEFT KNEE: ICD-10-CM

## 2024-08-22 DIAGNOSIS — Z98.890 S/P ARTHROSCOPIC RECONSTRUCTION OF ACL OF LEFT KNEE USING QUADRICEPS TENDON AUTOGRAFT: ICD-10-CM

## 2024-08-22 DIAGNOSIS — Z87.39 S/P ARTHROSCOPIC RECONSTRUCTION OF ACL OF LEFT KNEE USING QUADRICEPS TENDON AUTOGRAFT: ICD-10-CM

## 2024-08-22 PROCEDURE — 97032 APPL MODALITY 1+ESTIM EA 15: CPT | Mod: GP | Performed by: PHYSICAL THERAPIST

## 2024-08-22 PROCEDURE — 97110 THERAPEUTIC EXERCISES: CPT | Mod: GP | Performed by: PHYSICAL THERAPIST

## 2024-08-30 ENCOUNTER — THERAPY VISIT (OUTPATIENT)
Dept: PHYSICAL THERAPY | Facility: CLINIC | Age: 44
End: 2024-08-30
Payer: COMMERCIAL

## 2024-08-30 DIAGNOSIS — Z98.890 S/P MEDIAL MENISCUS REPAIR OF LEFT KNEE: ICD-10-CM

## 2024-08-30 DIAGNOSIS — M25.562 ACUTE PAIN OF LEFT KNEE: Primary | ICD-10-CM

## 2024-08-30 DIAGNOSIS — Z87.39 S/P ARTHROSCOPIC RECONSTRUCTION OF ACL OF LEFT KNEE USING QUADRICEPS TENDON AUTOGRAFT: ICD-10-CM

## 2024-08-30 DIAGNOSIS — Z98.890 S/P ARTHROSCOPIC RECONSTRUCTION OF ACL OF LEFT KNEE USING QUADRICEPS TENDON AUTOGRAFT: ICD-10-CM

## 2024-08-30 PROCEDURE — 97112 NEUROMUSCULAR REEDUCATION: CPT | Mod: GP

## 2024-08-30 PROCEDURE — 97110 THERAPEUTIC EXERCISES: CPT | Mod: GP

## 2024-09-06 DIAGNOSIS — Z98.890 S/P ACL RECONSTRUCTION: Primary | ICD-10-CM

## 2024-09-09 ENCOUNTER — ANCILLARY PROCEDURE (OUTPATIENT)
Dept: GENERAL RADIOLOGY | Facility: CLINIC | Age: 44
End: 2024-09-09
Attending: ORTHOPAEDIC SURGERY
Payer: COMMERCIAL

## 2024-09-09 ENCOUNTER — OFFICE VISIT (OUTPATIENT)
Dept: ORTHOPEDICS | Facility: CLINIC | Age: 44
End: 2024-09-09
Payer: COMMERCIAL

## 2024-09-09 VITALS — WEIGHT: 220 LBS | BODY MASS INDEX: 27.35 KG/M2 | HEIGHT: 75 IN

## 2024-09-09 DIAGNOSIS — Z98.890 S/P ACL RECONSTRUCTION: Primary | ICD-10-CM

## 2024-09-09 DIAGNOSIS — Z98.890 S/P ACL RECONSTRUCTION: ICD-10-CM

## 2024-09-09 PROCEDURE — 99024 POSTOP FOLLOW-UP VISIT: CPT | Mod: GC | Performed by: ORTHOPAEDIC SURGERY

## 2024-09-09 PROCEDURE — 73560 X-RAY EXAM OF KNEE 1 OR 2: CPT | Mod: LT | Performed by: RADIOLOGY

## 2024-09-09 NOTE — NURSING NOTE
"Reason For Visit:   Chief Complaint   Patient presents with    RECHECK     DOS: 7/26/24 left knee arthroscopy, ACL reconstruction with quad tendon autograft, medial meniscus repair     Date of surgery: 7/26/24  Type of surgery:   PROCEDURE:  Examination under anesthesia left Knee  left Knee arthroscopy  ACL reconstruction quad tendon autograft  Medial mensicus repair      SANE Score  Left Knee: 50  Right Knee:     Pain Assessment  Patient Currently in Pain: Yes  0-10 Pain Scale: 2  Primary Pain Location: Knee    Ht 1.905 m (6' 3\")   Wt 99.8 kg (220 lb)   BMI 27.50 kg/m         No Known Allergies    Current Outpatient Medications   Medication Sig Dispense Refill    acetaminophen (TYLENOL) 325 MG tablet Take 2 tablets (650 mg) by mouth every 4 hours as needed for mild pain 50 tablet 0    meloxicam (MOBIC) 15 MG tablet Take 1 tablet (15 mg) by mouth daily as needed for moderate pain 30 tablet 0    sildenafil (VIAGRA) 100 MG tablet Take 0.5-1 tablets ( mg) by mouth daily as needed 30 tablet 3    hydrOXYzine HCl (ATARAX) 25 MG tablet Take 1 tablet (25 mg) by mouth 3 times daily as needed for itching 20 tablet 0    ondansetron (ZOFRAN ODT) 4 MG ODT tab Take 1 tablet (4 mg) by mouth every 8 hours as needed for nausea (Patient not taking: Reported on 8/6/2024) 4 tablet 0    oxyCODONE (ROXICODONE) 5 MG tablet Take 1 tablet (5 mg) by mouth every 6 hours as needed for moderate to severe pain 20 tablet 0    senna-docusate (SENOKOT-S/PERICOLACE) 8.6-50 MG tablet Take 1-2 tablets by mouth 2 times daily 30 tablet 0     Current Facility-Administered Medications   Medication Dose Route Frequency Provider Last Rate Last Admin    lidocaine (PF) (XYLOCAINE) 1 % injection 3 mL  3 mL      3 mL at 04/25/24 1716    triamcinolone (KENALOG-40) injection 40 mg  40 mg      40 mg at 04/25/24 1716         Molly John, ATC    "

## 2024-09-09 NOTE — LETTER
9/9/2024      Chad M Johanson  [address] [address]      Dear Colleague,    Thank you for referring your patient, Chad M Johanson, to the [facility] [place]. Please see a copy of my visit note below.    Chief Complaint:   Follow up, DOS 7/26/24 with Dr. Meza    Procedures:  Examination under anesthesia left Knee  left Knee arthroscopy  ACL reconstruction quad tendon autograft  Medial mensicus repair    History:  Chad M Johanson is a 43 year old patient s/p above procedure, here for follow up. He has done well since surgery, pain is controlled with tylenol. No longer taking opioids. Working with physical therapy, reports best ROM 5-107 degrees. Weaned from crutches. Working on weaning out of brace. Works as an .       Exam:     General: Awake, Alert, and oriented. Articulates and communicates with a normal affect     Left Lower Extremity:  Incisions well healed without evidence of infection, no erythema, drainage, or wound dehiscence   Normal post-operative effusion and ecchymosis  Range of motion approx. 5 - 110 and stability exam not performed  TA/Gsc/EHL/FHL with 5/5 strength  Distal pulses palpable, toes are warm and well perfused   Gait: Use of 2 crutches     Imaging:  Xrs obtained today include AP and lateral views of the left knee with appropriate implant positioning without evidence of complication.     Assessment:  Doing well 6 weeks s/p above procedures with Dr. Meza. We discussed the plan below, all questions were answered to the best of my ability.     Plan:   Weightbearing: WBAT  Range of Motion: No range of motion restrictions  Pain Medications: We reviewed post-operative pain medications at today's visit. The patient has stopped all opioid pain medications and no further refills are required  Extension: We reviewed the importance of full knee extension and demonstrated the relevant exercises as appropriate  Crutches/Brace: Patient no  longer requires the hinged knee brace or crutches.   Acitivity Restrictions:  Discussed that this is the dangerous time after ACL reconstruction  Reviewed activity restrictions at today's visit  Goal to progress strength and motion to allow straight line running at three months from the date of surgery    Follow up: 6 weeks with no new radiographs needed    Eduar Vargas MD  Orthopedic Surgery        Patient seen and examined with the fellow. I also personally reviewed the images and interpreted the imaging myself.     Assesment: 6-week status post quad tendon ACL reconstruction left knee, medial meniscus repair    Plan: Weightbearing: WBAT  Range of Motion: No range of motion restrictions  Pain Medications: We reviewed post-operative pain medications at today's visit. The patient has stopped all opioid pain medications and no further refills are required  Extension: We reviewed the importance of full knee extension and demonstrated the relevant exercises as appropriate  Crutches/Brace: Patient no longer requires the hinged knee brace or crutches.   Acitivity Restrictions:  Discussed that this is the dangerous time after ACL reconstruction  Reviewed activity restrictions at today's visit  Goal to progress strength and motion to allow straight line running at three months from the date of surgery    Follow up: 6 weeks with no new radiographs needed     I agree with history, physical and imaging as well as the assessment and plan as detailed by Dr. Vargas.       Again, thank you for allowing me to participate in the care of your patient.        Sincerely,        Cas Meza MD

## 2024-09-09 NOTE — PROGRESS NOTES
Patient seen and examined with the fellow. I also personally reviewed the images and interpreted the imaging myself.     Assesment: 6-week status post quad tendon ACL reconstruction left knee, medial meniscus repair    Plan: Weightbearing: WBAT  Range of Motion: No range of motion restrictions  Pain Medications: We reviewed post-operative pain medications at today's visit. The patient has stopped all opioid pain medications and no further refills are required  Extension: We reviewed the importance of full knee extension and demonstrated the relevant exercises as appropriate  Crutches/Brace: Patient no longer requires the hinged knee brace or crutches.   Acitivity Restrictions:  Discussed that this is the dangerous time after ACL reconstruction  Reviewed activity restrictions at today's visit  Goal to progress strength and motion to allow straight line running at three months from the date of surgery    Follow up: 6 weeks with no new radiographs needed     I agree with history, physical and imaging as well as the assessment and plan as detailed by Dr. Vargas.

## 2024-09-09 NOTE — PROGRESS NOTES
Chief Complaint:   Follow up, DOS 7/26/24 with Dr. Meza    Procedures:  Examination under anesthesia left Knee  left Knee arthroscopy  ACL reconstruction quad tendon autograft  Medial mensicus repair    History:  Chad M Johanson is a 43 year old patient s/p above procedure, here for follow up. He has done well since surgery, pain is controlled with tylenol. No longer taking opioids. Working with physical therapy, reports best ROM 5-107 degrees. Weaned from crutches. Working on weaning out of brace. Works as an .       Exam:     General: Awake, Alert, and oriented. Articulates and communicates with a normal affect     Left Lower Extremity:  Incisions well healed without evidence of infection, no erythema, drainage, or wound dehiscence   Normal post-operative effusion and ecchymosis  Range of motion approx. 5 - 110 and stability exam not performed  TA/Gsc/EHL/FHL with 5/5 strength  Distal pulses palpable, toes are warm and well perfused   Gait: Use of 2 crutches     Imaging:  Xrs obtained today include AP and lateral views of the left knee with appropriate implant positioning without evidence of complication.     Assessment:  Doing well 6 weeks s/p above procedures with Dr. Meza. We discussed the plan below, all questions were answered to the best of my ability.     Plan:   Weightbearing: WBAT  Range of Motion: No range of motion restrictions  Pain Medications: We reviewed post-operative pain medications at today's visit. The patient has stopped all opioid pain medications and no further refills are required  Extension: We reviewed the importance of full knee extension and demonstrated the relevant exercises as appropriate  Crutches/Brace: Patient no longer requires the hinged knee brace or crutches.   Acitivity Restrictions:  Discussed that this is the dangerous time after ACL reconstruction  Reviewed activity restrictions at today's visit  Goal to progress strength and motion to allow straight  line running at three months from the date of surgery    Follow up: 6 weeks with no new radiographs needed    Eduar Vargas MD  Orthopedic Surgery

## 2024-09-11 ENCOUNTER — DOCUMENTATION ONLY (OUTPATIENT)
Dept: ORTHOPEDICS | Facility: CLINIC | Age: 44
End: 2024-09-11
Payer: COMMERCIAL

## 2024-09-11 NOTE — PROGRESS NOTES
Received Completed forms Yes   Faxed Forms Faxed To: [facility]   Fax Number: [phone]   Sent to HIM (Date) 9/11/24

## 2024-09-12 ENCOUNTER — THERAPY VISIT (OUTPATIENT)
Dept: PHYSICAL THERAPY | Facility: CLINIC | Age: 44
End: 2024-09-12
Payer: COMMERCIAL

## 2024-09-12 DIAGNOSIS — Z98.890 S/P MEDIAL MENISCUS REPAIR OF LEFT KNEE: ICD-10-CM

## 2024-09-12 DIAGNOSIS — M25.562 LEFT KNEE PAIN, UNSPECIFIED CHRONICITY: Primary | ICD-10-CM

## 2024-09-12 DIAGNOSIS — Z87.39 S/P ARTHROSCOPIC RECONSTRUCTION OF ACL OF LEFT KNEE USING QUADRICEPS TENDON AUTOGRAFT: ICD-10-CM

## 2024-09-12 DIAGNOSIS — Z98.890 S/P ARTHROSCOPIC RECONSTRUCTION OF ACL OF LEFT KNEE USING QUADRICEPS TENDON AUTOGRAFT: ICD-10-CM

## 2024-09-12 PROCEDURE — 97112 NEUROMUSCULAR REEDUCATION: CPT | Mod: GP

## 2024-09-12 PROCEDURE — 97110 THERAPEUTIC EXERCISES: CPT | Mod: GP

## 2024-09-19 ENCOUNTER — THERAPY VISIT (OUTPATIENT)
Dept: PHYSICAL THERAPY | Facility: CLINIC | Age: 44
End: 2024-09-19
Payer: COMMERCIAL

## 2024-09-19 DIAGNOSIS — Z87.39 S/P ARTHROSCOPIC RECONSTRUCTION OF ACL OF LEFT KNEE USING QUADRICEPS TENDON AUTOGRAFT: ICD-10-CM

## 2024-09-19 DIAGNOSIS — Z98.890 S/P ARTHROSCOPIC RECONSTRUCTION OF ACL OF LEFT KNEE USING QUADRICEPS TENDON AUTOGRAFT: ICD-10-CM

## 2024-09-19 DIAGNOSIS — M25.562 ACUTE PAIN OF LEFT KNEE: Primary | ICD-10-CM

## 2024-09-19 DIAGNOSIS — Z98.890 S/P MEDIAL MENISCUS REPAIR OF LEFT KNEE: ICD-10-CM

## 2024-09-19 PROCEDURE — 97110 THERAPEUTIC EXERCISES: CPT | Mod: GP

## 2024-09-19 PROCEDURE — 97112 NEUROMUSCULAR REEDUCATION: CPT | Mod: GP

## 2024-09-19 PROCEDURE — 97140 MANUAL THERAPY 1/> REGIONS: CPT | Mod: GP

## 2024-09-26 ENCOUNTER — THERAPY VISIT (OUTPATIENT)
Dept: PHYSICAL THERAPY | Facility: CLINIC | Age: 44
End: 2024-09-26
Payer: COMMERCIAL

## 2024-09-26 DIAGNOSIS — Z98.890 S/P MEDIAL MENISCUS REPAIR OF LEFT KNEE: ICD-10-CM

## 2024-09-26 DIAGNOSIS — M25.562 ACUTE PAIN OF LEFT KNEE: Primary | ICD-10-CM

## 2024-09-26 DIAGNOSIS — Z87.39 S/P ARTHROSCOPIC RECONSTRUCTION OF ACL OF LEFT KNEE USING QUADRICEPS TENDON AUTOGRAFT: ICD-10-CM

## 2024-09-26 DIAGNOSIS — Z98.890 S/P ARTHROSCOPIC RECONSTRUCTION OF ACL OF LEFT KNEE USING QUADRICEPS TENDON AUTOGRAFT: ICD-10-CM

## 2024-09-26 PROCEDURE — 97110 THERAPEUTIC EXERCISES: CPT | Mod: GP

## 2024-09-26 PROCEDURE — 97112 NEUROMUSCULAR REEDUCATION: CPT | Mod: GP

## 2024-09-26 PROCEDURE — 97140 MANUAL THERAPY 1/> REGIONS: CPT | Mod: GP

## 2024-10-01 ENCOUNTER — THERAPY VISIT (OUTPATIENT)
Dept: PHYSICAL THERAPY | Facility: CLINIC | Age: 44
End: 2024-10-01
Payer: COMMERCIAL

## 2024-10-01 DIAGNOSIS — M25.562 ACUTE PAIN OF LEFT KNEE: Primary | ICD-10-CM

## 2024-10-01 DIAGNOSIS — Z87.39 S/P ARTHROSCOPIC RECONSTRUCTION OF ACL OF LEFT KNEE USING QUADRICEPS TENDON AUTOGRAFT: ICD-10-CM

## 2024-10-01 DIAGNOSIS — Z98.890 S/P ARTHROSCOPIC RECONSTRUCTION OF ACL OF LEFT KNEE USING QUADRICEPS TENDON AUTOGRAFT: ICD-10-CM

## 2024-10-01 DIAGNOSIS — Z98.890 S/P MEDIAL MENISCUS REPAIR OF LEFT KNEE: ICD-10-CM

## 2024-10-01 PROCEDURE — 97112 NEUROMUSCULAR REEDUCATION: CPT | Mod: GP

## 2024-10-01 PROCEDURE — 97110 THERAPEUTIC EXERCISES: CPT | Mod: GP

## 2024-10-08 ENCOUNTER — THERAPY VISIT (OUTPATIENT)
Dept: PHYSICAL THERAPY | Facility: CLINIC | Age: 44
End: 2024-10-08
Payer: COMMERCIAL

## 2024-10-08 DIAGNOSIS — Z98.890 S/P ARTHROSCOPIC RECONSTRUCTION OF ACL OF LEFT KNEE USING QUADRICEPS TENDON AUTOGRAFT: ICD-10-CM

## 2024-10-08 DIAGNOSIS — M25.562 ACUTE PAIN OF LEFT KNEE: Primary | ICD-10-CM

## 2024-10-08 DIAGNOSIS — Z98.890 S/P MEDIAL MENISCUS REPAIR OF LEFT KNEE: ICD-10-CM

## 2024-10-08 DIAGNOSIS — Z87.39 S/P ARTHROSCOPIC RECONSTRUCTION OF ACL OF LEFT KNEE USING QUADRICEPS TENDON AUTOGRAFT: ICD-10-CM

## 2024-10-08 PROCEDURE — 97110 THERAPEUTIC EXERCISES: CPT | Mod: GP

## 2024-10-08 PROCEDURE — 97112 NEUROMUSCULAR REEDUCATION: CPT | Mod: GP

## 2024-10-08 PROCEDURE — 97140 MANUAL THERAPY 1/> REGIONS: CPT | Mod: GP

## 2024-10-14 ENCOUNTER — TELEPHONE (OUTPATIENT)
Dept: ORTHOPEDICS | Facility: CLINIC | Age: 44
End: 2024-10-14

## 2024-10-14 NOTE — TELEPHONE ENCOUNTER
Patient confirmed rescheduled appointment:  Date: 10/21 to 10/28/24  Time: 7:00am  Visit type: Return Knee  Provider: Dr. Meza

## 2024-10-21 ENCOUNTER — THERAPY VISIT (OUTPATIENT)
Dept: PHYSICAL THERAPY | Facility: CLINIC | Age: 44
End: 2024-10-21
Payer: COMMERCIAL

## 2024-10-21 DIAGNOSIS — Z98.890 S/P ARTHROSCOPIC RECONSTRUCTION OF ACL OF LEFT KNEE USING QUADRICEPS TENDON AUTOGRAFT: ICD-10-CM

## 2024-10-21 DIAGNOSIS — M25.562 ACUTE PAIN OF LEFT KNEE: Primary | ICD-10-CM

## 2024-10-21 DIAGNOSIS — Z98.890 S/P MEDIAL MENISCUS REPAIR OF LEFT KNEE: ICD-10-CM

## 2024-10-21 DIAGNOSIS — Z87.39 S/P ARTHROSCOPIC RECONSTRUCTION OF ACL OF LEFT KNEE USING QUADRICEPS TENDON AUTOGRAFT: ICD-10-CM

## 2024-10-21 PROCEDURE — 97110 THERAPEUTIC EXERCISES: CPT | Mod: GP

## 2024-10-21 PROCEDURE — 97140 MANUAL THERAPY 1/> REGIONS: CPT | Mod: GP

## 2024-10-21 PROCEDURE — 97112 NEUROMUSCULAR REEDUCATION: CPT | Mod: GP

## 2024-10-28 ENCOUNTER — OFFICE VISIT (OUTPATIENT)
Dept: ORTHOPEDICS | Facility: CLINIC | Age: 44
End: 2024-10-28
Payer: COMMERCIAL

## 2024-10-28 VITALS — BODY MASS INDEX: 27.35 KG/M2 | HEIGHT: 75 IN | WEIGHT: 220 LBS

## 2024-10-28 DIAGNOSIS — Z98.890 S/P ACL RECONSTRUCTION: Primary | ICD-10-CM

## 2024-10-28 PROCEDURE — 99213 OFFICE O/P EST LOW 20 MIN: CPT | Mod: GC | Performed by: ORTHOPAEDIC SURGERY

## 2024-10-28 NOTE — LETTER
October 28, 2024      Chad M Johanson  [address] [address]        To Whom It May Concern:    Chad M Johanson is under my professional care following left knee surgery. At this time he is able to return to work without any restrictions. Please contact my office with any questions or concerns.       Sincerely,        Cas Meza MD

## 2024-10-28 NOTE — NURSING NOTE
"Reason For Visit:   Chief Complaint   Patient presents with    RECHECK     DOS: 7/26/24 left knee arthroscopy, ACL reconstruction with quad tendon autograft, medial meniscus repair     Date of surgery: 7/26/24  Type of surgery:   PROCEDURE:  Examination under anesthesia left Knee  left Knee arthroscopy  ACL reconstruction quad tendon autograft  Medial mensicus repair      SANE Score  Left Knee: 75  Right Knee: 100    Pain Assessment  Patient Currently in Pain: Denies  Primary Pain Location: Knee    Ht 1.905 m (6' 3\")   Wt 99.8 kg (220 lb)   BMI 27.50 kg/m         No Known Allergies    Current Outpatient Medications   Medication Sig Dispense Refill    acetaminophen (TYLENOL) 325 MG tablet Take 2 tablets (650 mg) by mouth every 4 hours as needed for mild pain 50 tablet 0    meloxicam (MOBIC) 15 MG tablet Take 1 tablet (15 mg) by mouth daily as needed for moderate pain 30 tablet 0    sildenafil (VIAGRA) 100 MG tablet Take 0.5-1 tablets ( mg) by mouth daily as needed 30 tablet 3     Current Facility-Administered Medications   Medication Dose Route Frequency Provider Last Rate Last Admin    lidocaine (PF) (XYLOCAINE) 1 % injection 3 mL  3 mL      3 mL at 04/25/24 1716    triamcinolone (KENALOG-40) injection 40 mg  40 mg      40 mg at 04/25/24 1716         Molly John, ATC    "

## 2024-10-28 NOTE — LETTER
10/28/2024      Chad M Johanson  [address] [address]      Dear Colleague,    Thank you for referring your patient, Chad M Johanson, to the [facility] [place]. Please see a copy of my visit note below.    Chief Complaint:   1. Follow up, DOS 7/26/24 with Dr. Meza     Procedures:  1. Examination under anesthesia left Knee  2. left Knee arthroscopy  3. ACL reconstruction quad tendon autograft  4. Medial mensicus repair     History:  Chad M Johanson is a 43 year old patient s/p above procedure, here for follow up.  Reports he is doing well.  Would like to return to work.  Is making progress with therapy.  Asks about kneeling.  He works as an .  Notes that he is still building strength.  He does not usually limp, except at the end of a long day.    SANE: 75 left knee, 100 right knee     Exam:                 General: Awake, Alert, and oriented. Articulates and communicates with a normal affect                 Left Lower Extremity:  ? ROM: 2 to 120 degrees, gait is nonantalgic, grossly neurovascularly intact to the left leg, stable to lachman's    Imaging:  No new imaging        Assessment:  Doing well 12 weeks s/p above procedures with Dr. Meza.  Return to work note is written and given to the patient.  He will self limit with his employer.  We discussed the plan below, all questions were answered to the best of my ability.      Plan:   -Weight bearing: WBAT, no brace   -Range of motion full. No restrictions   -Continue work with physical therapy -okay to start straight line running, no cutting and pivoting activity until 6 months postop. Okay to start skating (but not playing hockey) when he can run.  -Follow up: PRN, 6 months postop, if desired or problems arise    Cristina Horta MD, PGY-5  Orthopedics      Patient seen and examined with the resident. I also personally reviewed the images and interpreted the imaging myself.     Assesment: 3 month following  QT ACL    Plan: Weightbearing: WBAT  Range of Motion: No range of motion restrictions.   Acitivity Restrictions:  May do straight line running at this time  No running distance or pace restrictions  No cutting, pivoting, or start-stop running  Goal to build strength, endurance, and confidence to allow sports in 3 months time (6 months from the date of surgery)  Brace: Discussed knee bracing options for sports including neoprene knee sleeve ACL functional bracing.   Discussed post-ACL reconstruction therapy program  Follow up: 3 months no XRays with an ACL functional test as completed by physical therapy.     I agree with history, physical and imaging as well as the assessment and plan as detailed by Dr. Horta.         Again, thank you for allowing me to participate in the care of your patient.      Sincerely,    Cas Meza MD

## 2024-10-28 NOTE — LETTER
{[facility] ORTHO WORKSLIP/VERIFICATION:[id number]}  2024     Seen today: {YES NO:467852}    Patient:  Chad M Johanson  :   1980  MRN:     [id number]  Physician: ROBERT MEZA M Johanson may return to work on Date: 10/29/2024.      The next clinic appointment is scheduled for approximately end of 2025.    Patient limitations:  Let pain be guide. All activities as tolerated.        Fax to: ***      Electronically signed by Robert Meza MD

## 2024-10-28 NOTE — CONFIDENTIAL NOTE
Chief Complaint:   1. Follow up, DOS 7/26/24 with Dr. Meza     Procedures:  1. Examination under anesthesia left Knee  2. left Knee arthroscopy  3. ACL reconstruction quad tendon autograft  4. Medial mensicus repair     History:  Chad M Johanson is a 43 year old patient s/p above procedure, here for follow up.  Reports he is doing well.  Would like to return to work.  Is making progress with therapy.  Asks about kneeling.  He works as an .  Notes that he is still building strength.  He does not usually limp, except at the end of a long day.    SANE: 75 left knee, 100 right knee     Exam:                 General: Awake, Alert, and oriented. Articulates and communicates with a normal affect                 Left Lower Extremity:  ? ROM: 5 to 120 degrees, gait is nonantalgic, grossly neurovascularly intact to the left leg    Imaging:  No new imaging        Assessment:  Doing well 12 weeks s/p above procedures with Dr. Meza.  Return to work note is written and given to the patient.  He will self limit with his employer.  We discussed the plan below, all questions were answered to the best of my ability.      Plan:   -Weight bearing: WBAT, no brace   -Range of motion full. No restrictions   -Continue work with physical therapy -okay to start straight line running, no cutting and pivoting activity yet  -Follow up: 6 months with Dr. Susana Horta MD, PGY-5  Orthopedics

## 2024-10-28 NOTE — PROGRESS NOTES
Chief Complaint:   1. Follow up, DOS 7/26/24 with Dr. Meza     Procedures:  1. Examination under anesthesia left Knee  2. left Knee arthroscopy  3. ACL reconstruction quad tendon autograft  4. Medial mensicus repair     History:  Chad M Johanson is a 43 year old patient s/p above procedure, here for follow up.  Reports he is doing well.  Would like to return to work.  Is making progress with therapy.  Asks about kneeling.  He works as an .  Notes that he is still building strength.  He does not usually limp, except at the end of a long day.    SANE: 75 left knee, 100 right knee     Exam:                 General: Awake, Alert, and oriented. Articulates and communicates with a normal affect                 Left Lower Extremity:  ? ROM: 2 to 120 degrees, gait is nonantalgic, grossly neurovascularly intact to the left leg, stable to lachman's    Imaging:  No new imaging        Assessment:  Doing well 12 weeks s/p above procedures with Dr. Meza.  Return to work note is written and given to the patient.  He will self limit with his employer.  We discussed the plan below, all questions were answered to the best of my ability.      Plan:   -Weight bearing: WBAT, no brace   -Range of motion full. No restrictions   -Continue work with physical therapy -okay to start straight line running, no cutting and pivoting activity until 6 months postop. Okay to start skating (but not playing hockey) when he can run.  -Follow up: PRN, 6 months postop, if desired or problems arise    Cristina Horta MD, PGY-5  Orthopedics

## 2024-10-28 NOTE — PROGRESS NOTES
Patient seen and examined with the resident. I also personally reviewed the images and interpreted the imaging myself.     Assesment: 3 month following QT ACL    Plan: Weightbearing: WBAT  Range of Motion: No range of motion restrictions.   Acitivity Restrictions:  May do straight line running at this time  No running distance or pace restrictions  No cutting, pivoting, or start-stop running  Goal to build strength, endurance, and confidence to allow sports in 3 months time (6 months from the date of surgery)  Brace: Discussed knee bracing options for sports including neoprene knee sleeve ACL functional bracing.   Discussed post-ACL reconstruction therapy program  Follow up: 3 months no XRays with an ACL functional test as completed by physical therapy.     I agree with history, physical and imaging as well as the assessment and plan as detailed by Dr. Horta.

## 2024-11-04 ENCOUNTER — THERAPY VISIT (OUTPATIENT)
Dept: PHYSICAL THERAPY | Facility: CLINIC | Age: 44
End: 2024-11-04
Payer: COMMERCIAL

## 2024-11-04 DIAGNOSIS — Z98.890 S/P MEDIAL MENISCUS REPAIR OF LEFT KNEE: ICD-10-CM

## 2024-11-04 DIAGNOSIS — M25.562 ACUTE PAIN OF LEFT KNEE: Primary | ICD-10-CM

## 2024-11-04 DIAGNOSIS — Z98.890 S/P ARTHROSCOPIC RECONSTRUCTION OF ACL OF LEFT KNEE USING QUADRICEPS TENDON AUTOGRAFT: ICD-10-CM

## 2024-11-04 DIAGNOSIS — Z87.39 S/P ARTHROSCOPIC RECONSTRUCTION OF ACL OF LEFT KNEE USING QUADRICEPS TENDON AUTOGRAFT: ICD-10-CM

## 2024-11-04 PROCEDURE — 97530 THERAPEUTIC ACTIVITIES: CPT | Mod: GP

## 2024-11-04 PROCEDURE — 97110 THERAPEUTIC EXERCISES: CPT | Mod: GP

## 2024-11-04 PROCEDURE — 97112 NEUROMUSCULAR REEDUCATION: CPT | Mod: GP

## 2024-11-04 NOTE — PROGRESS NOTES
Post-Op Progress Note    Surgery/Injury: ACL-R with QT autograft; medial meniscus repair     Involved Extremity: left   Date of Surgery/Injury: 7/26/24    Surgeon/MD: Dr. Meza  Therapist performing test: Adeline Arnold PT & Yola Melton SPT       Patient subjective symptom/function report: Pt reports that he is sore. He doesn't know if its due to weather or his first week back at work. Has not been doing any kneeling at working, mainly standing for prolonged periods of time.     LSI% =Limb Symmetry Index (score comparison between involved/uninvolved extremity)    Anthropomorphic Measures      Range of Motion Jt. Line Circum. Measurement 15 cm Prox Circum. Measurement   Uninvolved WNL 40 cm 48.5 cm   Involved 0-132 degrees 40.5 cm 47.5 cm   Difference  0.5 cm 1 cm       Single Leg Balance Max = 60 seconds   Uninvolved Extremity 8.07 seconds   Involved Extremity 5.05 seconds   LSI% 62.6%     Single Leg Squat for Depth    Uninvolved Extremity 88 degrees   Involved Extremity 65 degrees   LSI% 73.9%     Retro Step    Uninvolved Extremity 4 in.   Involved Extremity 4 in.  Unable to control     Notes on QUALITY of body movement patterns:  Pt is still shaky with decreased knee control during SL activities. During retro step, pt compensates by slightly pushing though contralateral foot for momentum and has trouble controlling the mvmt. SL balance with eyes closed was poor bilaterally, and pt reports that his balance has gotten worse overall as he's gotten older. Pt has increased valgus and instability with SL squat and was unable to perform without resting contralateral leg on the floor. Quad activation in still overall decreased with some hamstring and glute compensations.     Assessment & Plan     Pt is has made progress in function but is still having pain and swelling with prolonged and higher level activities. Quad activation is still decreased and SL activities are shaky with decreased knee control. Pt has gone  back to work with increased pain; his job requires a lot of kneeling and squatting activities. Skilled therapy is still indicated to improve quad activation and control and improve function.    PLAN OF CARE  1x/week for 3 months     Signing Clinician: Adeline Arnold PT

## 2024-11-22 ENCOUNTER — THERAPY VISIT (OUTPATIENT)
Dept: PHYSICAL THERAPY | Facility: CLINIC | Age: 44
End: 2024-11-22
Payer: COMMERCIAL

## 2024-11-22 DIAGNOSIS — Z98.890 S/P ARTHROSCOPIC RECONSTRUCTION OF ACL OF LEFT KNEE USING QUADRICEPS TENDON AUTOGRAFT: ICD-10-CM

## 2024-11-22 DIAGNOSIS — M25.562 ACUTE PAIN OF LEFT KNEE: Primary | ICD-10-CM

## 2024-11-22 DIAGNOSIS — Z87.39 S/P ARTHROSCOPIC RECONSTRUCTION OF ACL OF LEFT KNEE USING QUADRICEPS TENDON AUTOGRAFT: ICD-10-CM

## 2024-11-22 DIAGNOSIS — Z98.890 S/P MEDIAL MENISCUS REPAIR OF LEFT KNEE: ICD-10-CM

## 2024-11-22 PROCEDURE — 97112 NEUROMUSCULAR REEDUCATION: CPT | Mod: GP

## 2024-11-22 PROCEDURE — 97110 THERAPEUTIC EXERCISES: CPT | Mod: GP

## 2024-12-09 ENCOUNTER — THERAPY VISIT (OUTPATIENT)
Dept: PHYSICAL THERAPY | Facility: CLINIC | Age: 44
End: 2024-12-09
Payer: COMMERCIAL

## 2024-12-09 DIAGNOSIS — Z98.890 S/P MEDIAL MENISCUS REPAIR OF LEFT KNEE: ICD-10-CM

## 2024-12-09 DIAGNOSIS — M25.562 LEFT KNEE PAIN: Primary | ICD-10-CM

## 2024-12-09 DIAGNOSIS — Z98.890 S/P ARTHROSCOPIC RECONSTRUCTION OF ACL OF LEFT KNEE USING QUADRICEPS TENDON AUTOGRAFT: ICD-10-CM

## 2024-12-09 DIAGNOSIS — Z87.39 S/P ARTHROSCOPIC RECONSTRUCTION OF ACL OF LEFT KNEE USING QUADRICEPS TENDON AUTOGRAFT: ICD-10-CM

## 2024-12-09 PROCEDURE — 97140 MANUAL THERAPY 1/> REGIONS: CPT | Mod: GP | Performed by: PHYSICAL THERAPIST

## 2024-12-09 PROCEDURE — 97110 THERAPEUTIC EXERCISES: CPT | Mod: GP | Performed by: PHYSICAL THERAPIST

## 2024-12-09 PROCEDURE — 97112 NEUROMUSCULAR REEDUCATION: CPT | Mod: GP | Performed by: PHYSICAL THERAPIST

## 2024-12-11 ENCOUNTER — TELEPHONE (OUTPATIENT)
Dept: FAMILY MEDICINE | Facility: CLINIC | Age: 44
End: 2024-12-11
Payer: COMMERCIAL

## 2024-12-11 NOTE — TELEPHONE ENCOUNTER
Forms/Letter Request    Type of form/letter: OTHER: unemployment form        Do we have the form/letter: Yes:     Who is the form from? Patient   Please call patient after its faxed to let him know.    Where did/will the form come from? Patient or family brought in       When is form/letter needed by: ASAP    How would you like the form/letter returned: Fax : [phone]    Patient Notified form requests are processed in 5-7 business days:Yes    Could we send this information to you in [place] or would you prefer to receive a phone call?:   Patient would prefer a phone call     Okay to leave a detailed message?: Yes at Home number on file [phone] (home)

## 2024-12-12 NOTE — TELEPHONE ENCOUNTER
Forms faxed to [facility] fax # [phone] and copy sent to stat scan.     Arminda DIAZ  TC

## 2024-12-18 ENCOUNTER — THERAPY VISIT (OUTPATIENT)
Dept: PHYSICAL THERAPY | Facility: CLINIC | Age: 44
End: 2024-12-18
Payer: COMMERCIAL

## 2024-12-18 DIAGNOSIS — M25.562 ACUTE PAIN OF LEFT KNEE: Primary | ICD-10-CM

## 2024-12-18 DIAGNOSIS — Z98.890 S/P MEDIAL MENISCUS REPAIR OF LEFT KNEE: ICD-10-CM

## 2024-12-18 DIAGNOSIS — Z87.39 S/P ARTHROSCOPIC RECONSTRUCTION OF ACL OF LEFT KNEE USING QUADRICEPS TENDON AUTOGRAFT: ICD-10-CM

## 2024-12-18 DIAGNOSIS — Z98.890 S/P ARTHROSCOPIC RECONSTRUCTION OF ACL OF LEFT KNEE USING QUADRICEPS TENDON AUTOGRAFT: ICD-10-CM

## 2024-12-18 PROCEDURE — 97140 MANUAL THERAPY 1/> REGIONS: CPT | Mod: GP

## 2024-12-18 PROCEDURE — 97110 THERAPEUTIC EXERCISES: CPT | Mod: GP

## 2024-12-18 NOTE — PROGRESS NOTES
Date  12/18/24 Limb Occlusion Pressure    290 mmHg Percent (%) Occlusion    70% Training Occlusion Pressure    203 mm Hg Exercises Performed     Single Leg Press   30 80#,   15 15, 15, each 90#    Total time under tourniquet        9:40 min      Immediate effects     7-8/10 fatigue  8-9/10 fatigue  9/10 fatigue   9.5/10 fatigue          Lingering effects (from previous session)    None   NOTES: Air Bands     Blood Flow Restriction Training: Contraindications and precautions reviewed, pt safe for use of  modality, Risks and benefits discussed; pt gave informed consent

## 2024-12-23 ENCOUNTER — THERAPY VISIT (OUTPATIENT)
Dept: PHYSICAL THERAPY | Facility: CLINIC | Age: 44
End: 2024-12-23
Payer: COMMERCIAL

## 2024-12-23 DIAGNOSIS — Z98.890 S/P MEDIAL MENISCUS REPAIR OF LEFT KNEE: ICD-10-CM

## 2024-12-23 DIAGNOSIS — M25.562 ACUTE PAIN OF LEFT KNEE: Primary | ICD-10-CM

## 2024-12-23 DIAGNOSIS — Z87.39 S/P ARTHROSCOPIC RECONSTRUCTION OF ACL OF LEFT KNEE USING QUADRICEPS TENDON AUTOGRAFT: ICD-10-CM

## 2024-12-23 DIAGNOSIS — Z98.890 S/P ARTHROSCOPIC RECONSTRUCTION OF ACL OF LEFT KNEE USING QUADRICEPS TENDON AUTOGRAFT: ICD-10-CM

## 2024-12-23 PROCEDURE — 97140 MANUAL THERAPY 1/> REGIONS: CPT | Mod: GP

## 2024-12-23 PROCEDURE — 97530 THERAPEUTIC ACTIVITIES: CPT | Mod: GP

## 2024-12-23 PROCEDURE — 97110 THERAPEUTIC EXERCISES: CPT | Mod: GP

## 2024-12-23 NOTE — PROGRESS NOTES
Date  12/23/24 Limb Occlusion Pressure     254 mmHg Percent (%) Occlusion     70% Training Occlusion Pressure    178 mmHg Exercises Performed     SLR  30, 15, 15, 15            Total time under tourniquet     7:50         Immediate effects     7-8/10  8/10 fatigue          Lingering effects (from previous session)     None   NOTES: Air Bands     Blood Flow Restriction Training: Contraindications and precautions reviewed, pt safe for use of  modality, Risks and benefits discussed; pt gave informed consent

## 2024-12-26 ENCOUNTER — THERAPY VISIT (OUTPATIENT)
Dept: PHYSICAL THERAPY | Facility: CLINIC | Age: 44
End: 2024-12-26
Payer: COMMERCIAL

## 2024-12-26 DIAGNOSIS — Z98.890 S/P MEDIAL MENISCUS REPAIR OF LEFT KNEE: ICD-10-CM

## 2024-12-26 DIAGNOSIS — Z87.39 S/P ARTHROSCOPIC RECONSTRUCTION OF ACL OF LEFT KNEE USING QUADRICEPS TENDON AUTOGRAFT: ICD-10-CM

## 2024-12-26 DIAGNOSIS — Z98.890 S/P ARTHROSCOPIC RECONSTRUCTION OF ACL OF LEFT KNEE USING QUADRICEPS TENDON AUTOGRAFT: ICD-10-CM

## 2024-12-26 DIAGNOSIS — M25.562 ACUTE PAIN OF LEFT KNEE: Primary | ICD-10-CM

## 2024-12-26 NOTE — PROGRESS NOTES
Date  12/26/24 Limb Occlusion Pressure     185 mmHg Percent (%) Occlusion     80% Training Occlusion Pressure     148 mmHg Exercises Performed     Standing black band TKE 30, 15, 15, 15       Side Steps with GTB  60 sec,   30, 30, 30 sec  -last 3 sets with toss/catch neurocog challenge     Leg Press 100#, then 120#, then 140# double leg  30, 15, 15       Total time under tourniquet     7:45       6:00                 Immediate effects     7/10 fatigue          7/10 fatigue Lingering effects (from previous session)     None   NOTES: Air Bands     Blood Flow Restriction Training: Contraindications and precautions reviewed, pt safe for use of  modality, Risks and benefits discussed; pt gave informed consent

## 2024-12-31 ENCOUNTER — THERAPY VISIT (OUTPATIENT)
Dept: PHYSICAL THERAPY | Facility: CLINIC | Age: 44
End: 2024-12-31
Payer: COMMERCIAL

## 2024-12-31 DIAGNOSIS — Z87.39 S/P ARTHROSCOPIC RECONSTRUCTION OF ACL OF LEFT KNEE USING QUADRICEPS TENDON AUTOGRAFT: ICD-10-CM

## 2024-12-31 DIAGNOSIS — Z98.890 S/P MEDIAL MENISCUS REPAIR OF LEFT KNEE: ICD-10-CM

## 2024-12-31 DIAGNOSIS — Z98.890 S/P ARTHROSCOPIC RECONSTRUCTION OF ACL OF LEFT KNEE USING QUADRICEPS TENDON AUTOGRAFT: ICD-10-CM

## 2024-12-31 DIAGNOSIS — M25.562 ACUTE PAIN OF LEFT KNEE: Primary | ICD-10-CM

## 2024-12-31 PROCEDURE — 97110 THERAPEUTIC EXERCISES: CPT | Mod: GP

## 2024-12-31 PROCEDURE — 97140 MANUAL THERAPY 1/> REGIONS: CPT | Mod: GP

## 2024-12-31 NOTE — PROGRESS NOTES
Date  12/31/24 Limb Occlusion Pressure     237 mmHg Percent (%) Occlusion     70% Training Occlusion Pressure     166 mmHg Exercises Performed      Leg Press 120#, 140#, 150#, 150#  double leg  30, 15, 15, 15    TKE with black TB  20, 20, 20       Total time under tourniquet     8:00                               Immediate effects     7-8/10 fatigue          7-8/10 fatigue Lingering effects (from previous session)     None   NOTES: Air Bands     Blood Flow Restriction Training: Contraindications and precautions reviewed, pt safe for use of  modality, Risks and benefits discussed; pt gave informed consent

## 2025-01-02 ENCOUNTER — THERAPY VISIT (OUTPATIENT)
Dept: PHYSICAL THERAPY | Facility: CLINIC | Age: 45
End: 2025-01-02
Payer: COMMERCIAL

## 2025-01-02 DIAGNOSIS — Z87.39 S/P ARTHROSCOPIC RECONSTRUCTION OF ACL OF LEFT KNEE USING QUADRICEPS TENDON AUTOGRAFT: ICD-10-CM

## 2025-01-02 DIAGNOSIS — M25.562 ACUTE PAIN OF LEFT KNEE: Primary | ICD-10-CM

## 2025-01-02 DIAGNOSIS — Z98.890 S/P MEDIAL MENISCUS REPAIR OF LEFT KNEE: ICD-10-CM

## 2025-01-02 DIAGNOSIS — Z98.890 S/P ARTHROSCOPIC RECONSTRUCTION OF ACL OF LEFT KNEE USING QUADRICEPS TENDON AUTOGRAFT: ICD-10-CM

## 2025-01-02 NOTE — PROGRESS NOTES
"Date  1/2/25 Limb Occlusion Pressure     204 mmHg Percent (%) Occlusion     80% Training Occlusion Pressure     163 mmHg Exercises Performed     10# goblet squat   30, 15, 15, 15       Step-Up fwd onto 6\" step  20 (9#), 15 (9#), 15 (9#), 15 (18#)    Wall sit  60 sec, 45, 45, 45          Total time under tourniquet     8:00      8:00               6:00       Immediate effects     7-8/10 fatigue         7-8/10 fatigue      8-9/10 fatigue Lingering effects (from previous session)     None   NOTES: Air Bands     Blood Flow Restriction Training: Contraindications and precautions reviewed, pt safe for use of  modality, Risks and benefits discussed; pt gave informed consent  " No

## 2025-01-07 ENCOUNTER — THERAPY VISIT (OUTPATIENT)
Dept: PHYSICAL THERAPY | Facility: CLINIC | Age: 45
End: 2025-01-07
Payer: COMMERCIAL

## 2025-01-07 DIAGNOSIS — Z87.39 S/P ARTHROSCOPIC RECONSTRUCTION OF ACL OF LEFT KNEE USING QUADRICEPS TENDON AUTOGRAFT: Primary | ICD-10-CM

## 2025-01-07 DIAGNOSIS — Z98.890 S/P MEDIAL MENISCUS REPAIR OF LEFT KNEE: ICD-10-CM

## 2025-01-07 DIAGNOSIS — Z98.890 S/P ARTHROSCOPIC RECONSTRUCTION OF ACL OF LEFT KNEE USING QUADRICEPS TENDON AUTOGRAFT: Primary | ICD-10-CM

## 2025-01-07 PROCEDURE — 20560 NDL INSJ W/O NJX 1 OR 2 MUSC: CPT | Performed by: PHYSICAL THERAPIST

## 2025-01-09 ENCOUNTER — THERAPY VISIT (OUTPATIENT)
Dept: PHYSICAL THERAPY | Facility: CLINIC | Age: 45
End: 2025-01-09
Payer: COMMERCIAL

## 2025-01-09 DIAGNOSIS — Z98.890 S/P MEDIAL MENISCUS REPAIR OF LEFT KNEE: ICD-10-CM

## 2025-01-09 DIAGNOSIS — Z98.890 S/P ARTHROSCOPIC RECONSTRUCTION OF ACL OF LEFT KNEE USING QUADRICEPS TENDON AUTOGRAFT: ICD-10-CM

## 2025-01-09 DIAGNOSIS — Z87.39 S/P ARTHROSCOPIC RECONSTRUCTION OF ACL OF LEFT KNEE USING QUADRICEPS TENDON AUTOGRAFT: ICD-10-CM

## 2025-01-09 DIAGNOSIS — M25.562 ACUTE PAIN OF LEFT KNEE: Primary | ICD-10-CM

## 2025-01-09 NOTE — PROGRESS NOTES
Date  1/9/25 Limb Occlusion Pressure     220 mmHg Percent (%) Occlusion     80% Training Occlusion Pressure     176 mmHg Exercises Performed     SAQ over bolster  30, 15 (5#), 15 (5#)    Single leg leg press  30, 15, 15 at 80#                Total time under tourniquet     7:45     8:00 Immediate effects     7-8/10 fatigue         9-10/10 fatigue         Lingering effects (from previous session)     None   NOTES: Air Bands. Some increased pain under the cuff today.      Blood Flow Restriction Training: Contraindications and precautions reviewed, pt safe for use of  modality, Risks and benefits discussed; pt gave informed consent

## 2025-01-13 ENCOUNTER — THERAPY VISIT (OUTPATIENT)
Dept: PHYSICAL THERAPY | Facility: CLINIC | Age: 45
End: 2025-01-13
Payer: COMMERCIAL

## 2025-01-13 DIAGNOSIS — M25.562 ACUTE PAIN OF LEFT KNEE: Primary | ICD-10-CM

## 2025-01-13 DIAGNOSIS — Z98.890 S/P ARTHROSCOPIC RECONSTRUCTION OF ACL OF LEFT KNEE USING QUADRICEPS TENDON AUTOGRAFT: ICD-10-CM

## 2025-01-13 DIAGNOSIS — Z98.890 S/P MEDIAL MENISCUS REPAIR OF LEFT KNEE: ICD-10-CM

## 2025-01-13 DIAGNOSIS — Z87.39 S/P ARTHROSCOPIC RECONSTRUCTION OF ACL OF LEFT KNEE USING QUADRICEPS TENDON AUTOGRAFT: ICD-10-CM

## 2025-01-13 PROCEDURE — 97530 THERAPEUTIC ACTIVITIES: CPT | Mod: GP

## 2025-01-13 PROCEDURE — 97110 THERAPEUTIC EXERCISES: CPT | Mod: GP

## 2025-01-13 NOTE — PROGRESS NOTES
ASSESSMENT   5.5 months s/p ACL-R and medial meniscus repair, with ongoing persistent anterior knee pain and fluctuating joint effusion. This is worse with squatting/lunging activities and has limited tolerance to quad strengthening. He is back to basic ADLs without issue but continues to demonstrate deficits in quad strength (today 56% LSI in knee extension strength, per handheld dynamometer), control of terminal knee extension, and single leg stability. Pt would continue to benefit from PT to address remaining impairments and promote full return to function, as well as follow-up with surgeon for further evaluation of the knee.       PLAN  Continue therapy per current plan of care.  1x/week in PT    Beginning/End Dates of Progress Note Reporting Period:  07/31/24 to 01/13/2025    Referring Provider:  Cas Meza     01/13/25 0500   Appointment Info   Signing clinician's name / credentials Adeline Arnold, PT DPT [facility]   Total/Authorized Visits E&T   Visits Used 27   Medical Diagnosis s/p L ACL-R w/quad tendon autograft, medial mensicus repair   PT Tx Diagnosis s/p L ACL-R w/quad tendon autograft, medial mensicus repair   Precautions/Limitations WBAT, no ROM restrictions, Pt no longer requires knee brace or crutches   Other pertinent information , no knee pain or deficits before injury, reports has had limited ext-flexion for >1 month prior to surgery   Progress Note/Certification   Onset of illness/injury or Date of Surgery 07/26/24  (DOS)   Therapy Frequency 1x/week   Predicted Duration 3 months   Progress Note Completed Date 07/31/24   GOALS   PT Goals 2;3;4   PT Goal 1   Goal Identifier Stairs   Goal Description Pt will be able to ascend/descend stairs reciprocally with no AD   Rationale to maximize safety and independence with performance of ADLs and functional tasks;to maximize safety and independence within the home;to maximize safety and independence within the community   Goal Progress  goal met - slow/difficult descending but able to complete reciprocally without AD   Target Date 09/25/24   Date Met 10/08/24   PT Goal 2   Goal Identifier Ambulation   Goal Description Pt will be able to ambulate with no brace/AD and normalized patterning, for 5 mins or 2 blocks.   Rationale to maximize safety and independence with performance of ADLs and functional tasks   Goal Progress goal met, ambulating >2 blocks (still with intermittent stiff gait/antalgia)   Target Date 10/03/24   Date Met 10/08/24   PT Goal 3   Goal Identifier Ambulation LTG   Goal Description Pt will be able to ambulate with normalized gait and no knee pain/swelling for 20 minutes or 1 mile   Rationale to maximize safety and independence with performance of ADLs and functional tasks;to maximize safety and independence within the community   Goal Progress has not tried long walks, still a slight limp with short walks   Target Date 01/28/25   PT Goal 4   Goal Identifier Function   Goal Description Pt will be able to squat, lunge, kneel, and climb latters without limitation from left knee.   Rationale to maximize safety and independence with self cares  (return to work)   Goal Progress still has knee pain with squatting and kneeling activities -extend goal   Target Date 02/28/25   Subjective Report   Subjective Report The knee has been alright, the pain is not as bad, getting better. Has been busy with kids games and working on the basement. Trying to keep up with exercises as best he can. Likely going back to work soon and may have to commute 1+ hour, might be working 70-hour weeks.   Objective Measure 1   Details +TTP medial joint line anteriorly. trace effusion   Objective Measure 2   Details 0-0-134 with overpressure   Objective Measure 3   Objective Measure SL Squat for Depth   Details 80 deg on Right; 68 deg on Left with significant proprio challenge   Objective Measure 4   Objective Measure knee extension strength (measured with Tindeq  "at 70 deg KF)   Details RIGHT 153.7 lbs; 172. 7 lbs. LEFT: 97 lbs. = 56% LSI   Treatment Interventions (PT)   Interventions Therapeutic Procedure/Exercise   Therapeutic Procedure/Exercise   Therapeutic Procedures: strength, endurance, ROM, flexibility minutes (29872) 25   Ther Proc 1 Upright Bike   Ther Proc 1 - Details x5 mins level 4-5 for warmup   Ther Proc 2 DL Squat (knee flexion to ~75)   Ther Proc 2 - Details 3x 10 first AG, then 18# KB, then 35# KB. inst to increase weight at home, depth per tolerance   PTRx Ther Proc 1 SL Step-Up   PTRx Ther Proc 1 - Details 6\" 2x, 12, cues for controlled eccentric lower and dynamic valgus control   PTRx Ther Proc 2 LAQ   PTRx Ther Proc 2 - Details x10 B with blue TB for pre-testing warmup; then 2x 15 with 10# ankle weight on R. fatiguing, reports popping but not pain   PTRx Ther Proc 3 TE   PTRx Ther Proc 3 - Details reviewed stretches and importance of consistently working on active terminal knee extension   Skilled Intervention exercise RX & instruction with emphasis on tolerable quad loading   Patient Response/Progress intermittent anterior knee soreness overall tolerates well   Therapeutic Activity   Therapeutic Activities: dynamic activities to improve functional performance minutes (22502) 15   Ther Act 1 TA   Ther Act 1 - Details phys performance testing including time to interpret results with patient   PTRx Ther Act 1 TA   PTRx Ther Act 1 - Details discussed f/u with surgeon for further assessment and treatment of persistent pain   Skilled Intervention pt education, symptom management   Patient Response/Progress receptive   Education   Learner/Method Patient   Plan   Home program PTRX on phone   Updates to plan of care consolidated HEP to 3 basic quad strengtheners. going back to surgeon for further evaluation   Plan for next session balance/proprio drills / NMR   Total Session Time   Timed Code Treatment Minutes 40   Total Treatment Time (sum of timed and untimed " services) 40

## 2025-01-20 ENCOUNTER — THERAPY VISIT (OUTPATIENT)
Dept: PHYSICAL THERAPY | Facility: CLINIC | Age: 45
End: 2025-01-20
Payer: COMMERCIAL

## 2025-01-20 ENCOUNTER — OFFICE VISIT (OUTPATIENT)
Dept: ORTHOPEDICS | Facility: CLINIC | Age: 45
End: 2025-01-20
Payer: COMMERCIAL

## 2025-01-20 DIAGNOSIS — Z98.890 S/P ARTHROSCOPIC RECONSTRUCTION OF ACL OF LEFT KNEE USING QUADRICEPS TENDON AUTOGRAFT: ICD-10-CM

## 2025-01-20 DIAGNOSIS — Z87.39 S/P ARTHROSCOPIC RECONSTRUCTION OF ACL OF LEFT KNEE USING QUADRICEPS TENDON AUTOGRAFT: ICD-10-CM

## 2025-01-20 DIAGNOSIS — Z98.890 S/P MEDIAL MENISCUS REPAIR OF LEFT KNEE: ICD-10-CM

## 2025-01-20 DIAGNOSIS — Z98.890 S/P ACL RECONSTRUCTION: Primary | ICD-10-CM

## 2025-01-20 DIAGNOSIS — M25.562 LEFT KNEE PAIN: Primary | ICD-10-CM

## 2025-01-20 PROCEDURE — 99212 OFFICE O/P EST SF 10 MIN: CPT | Mod: GC | Performed by: ORTHOPAEDIC SURGERY

## 2025-01-20 PROCEDURE — 97110 THERAPEUTIC EXERCISES: CPT | Mod: GP | Performed by: PHYSICAL THERAPIST

## 2025-01-20 PROCEDURE — 97140 MANUAL THERAPY 1/> REGIONS: CPT | Mod: GP | Performed by: PHYSICAL THERAPIST

## 2025-01-20 NOTE — NURSING NOTE
Reason For Visit:   Chief Complaint   Patient presents with    Left Knee - Follow Up     DOS: 7/26/24; Left knee arthroscopy, ACL reconstruction with quadriceps tendon autograft, medial meniscus repair       Date of surgery: 7/26/24  Type of surgery:   Examination under anesthesia left Knee  left Knee arthroscopy  ACL reconstruction quad tendon autograft  Medial mensicus repair.    Patient reports pain over the medial joint line, popping over the anterior knee and patella, and knee buckling. He states these issues have limited him in PT. Patient states he starts his job as an  tomorrow.     SANE Score  Left Knee: 66  Right Knee: 100         There were no vitals taken for this visit.       No Known Allergies    Current Outpatient Medications   Medication Sig Dispense Refill    acetaminophen (TYLENOL) 325 MG tablet Take 2 tablets (650 mg) by mouth every 4 hours as needed for mild pain 50 tablet 0    diclofenac (VOLTAREN) 75 MG EC tablet Take 1 tablet (75 mg) by mouth 2 times daily. 60 tablet 0    meloxicam (MOBIC) 15 MG tablet Take 1 tablet (15 mg) by mouth daily as needed for moderate pain 30 tablet 0    sildenafil (VIAGRA) 100 MG tablet Take 0.5-1 tablets ( mg) by mouth daily as needed 30 tablet 3     Current Facility-Administered Medications   Medication Dose Route Frequency Provider Last Rate Last Admin    lidocaine (PF) (XYLOCAINE) 1 % injection 3 mL  3 mL      3 mL at 04/25/24 1716    triamcinolone (KENALOG-40) injection 40 mg  40 mg      40 mg at 04/25/24 1716         Mirna Acuna, DAVID

## 2025-01-20 NOTE — PROGRESS NOTES
"Chief Complaint:   Follow up, DOS 7/26/24 with Dr. Meaz    Procedures:  Examination under anesthesia left Knee  Left Knee Arthroscopy  ACL reconstruction quad tendon autograft  Medial mensicus repair    History:  Chad M Johanson is a 43 year old patient s/p above procedure, here for follow up. Has been slowly improving with physical therapy. Notes some pain along the anterior medial joint line and a \"clicking\" sensation when bending the knee. Remains in therapy. Overall feels like he is on a positive trajectory. Did some dry needling recently and this was very beneficial for him.     Exam:     General: Awake, Alert, and oriented. Articulates and communicates with a normal affect     Left Lower Extremity:  Incisions well healed without evidence of infection, no erythema, drainage, or wound dehiscence   Normal post-operative effusion and ecchymosis  Range of motion approx. 3 - 130, 1A lachman   TA/Gsc/EHL/FHL with 5/5 strength  Distal pulses palpable, toes are warm and well perfused     Imaging:  No new imaging    Assessment:  6 months s/p quad ACL reconstruction and medial meniscus repair     Plan:   Overall he feels like he is doing well and is on a positive trajectory. He is lacking a few degrees of extension and has pain along the anterior and medial joint line. Also reports a clicking sensation throughout the knee. He plans to continue with PT and monitoring his symptoms. If he fails to make progress he will reach out and we will order an MRI.     Eduar Vargas MD  Orthopedic Surgery    "

## 2025-01-20 NOTE — LETTER
"1/20/2025      Chad M Johanson  [address] [id number]      Dear Colleague,    Thank you for referring your patient, Chad M Johanson, to the [facility] [place]. Please see a copy of my visit note below.    Chief Complaint:   Follow up, DOS 7/26/24 with Dr. Meza    Procedures:  Examination under anesthesia left Knee  Left Knee Arthroscopy  ACL reconstruction quad tendon autograft  Medial mensicus repair    History:  Chad M Johanson is a 43 year old patient s/p above procedure, here for follow up. Has been slowly improving with physical therapy. Notes some pain along the anterior medial joint line and a \"clicking\" sensation when bending the knee. Remains in therapy. Overall feels like he is on a positive trajectory. Did some dry needling recently and this was very beneficial for him.     Exam:     General: Awake, Alert, and oriented. Articulates and communicates with a normal affect     Left Lower Extremity:  Incisions well healed without evidence of infection, no erythema, drainage, or wound dehiscence   Normal post-operative effusion and ecchymosis  Range of motion approx. 3 - 130, 1A lachman   TA/Gsc/EHL/FHL with 5/5 strength  Distal pulses palpable, toes are warm and well perfused     Imaging:  No new imaging    Assessment:  6 months s/p quad ACL reconstruction and medial meniscus repair     Plan:   Overall he feels like he is doing well and is on a positive trajectory. He is lacking a few degrees of extension and has pain along the anterior and medial joint line. Also reports a clicking sensation throughout the knee. He plans to continue with PT and monitoring his symptoms. If he fails to make progress he will reach out and we will order an MRI.     Eduar Vargas MD  Orthopedic Surgery      Patient seen and examined with the fellow. I also personally reviewed the images and interpreted the imaging myself.     Assesment: 6-month status post ACL reconstruction " quad tendon autograft    The patient notes some ongoing popping and catching.  In light of this information we have discussed both with the patient the physical therapist or repeat imaging.  At this time the patient feels like he is on a good trajectory and he like to see how he does for the next 4 to 6 weeks.  As long as he continues to see improvement I think this is a very reasonable plan.  If he should plateau or worsen the neck step in his workup would be an MRI.  He knows to call our office if he should have ongoing symptoms and we can get a new MRI.    Plan: Weightbearing: No weight bearing restriction  Range of Motion: No range of motion restrictions.   Acitivity Restrictions:  Begin to return to sports in a structured manner   Goal to return to game competition between 7-10 months    Follow up: As needed basis.  He is cleared for all activities.  Patient to call for an MRI if he should have ongoing popping catching symptoms.  Differential diagnosis would be cyclops lesion versus meniscus tear    I agree with history, physical and imaging as well as the assessment and plan as detailed by Dr. Vargas.       Again, thank you for allowing me to participate in the care of your patient.      Sincerely,    Cas Meza MD    Electronically signed

## 2025-01-21 DIAGNOSIS — Z98.890 S/P ACL RECONSTRUCTION: ICD-10-CM

## 2025-01-21 NOTE — PROGRESS NOTES
Patient seen and examined with the fellow. I also personally reviewed the images and interpreted the imaging myself.     Assesment: 6-month status post ACL reconstruction quad tendon autograft    The patient notes some ongoing popping and catching.  In light of this information we have discussed both with the patient the physical therapist or repeat imaging.  At this time the patient feels like he is on a good trajectory and he like to see how he does for the next 4 to 6 weeks.  As long as he continues to see improvement I think this is a very reasonable plan.  If he should plateau or worsen the neck step in his workup would be an MRI.  He knows to call our office if he should have ongoing symptoms and we can get a new MRI.    Plan: Weightbearing: No weight bearing restriction  Range of Motion: No range of motion restrictions.   Acitivity Restrictions:  Begin to return to sports in a structured manner   Goal to return to game competition between 7-10 months    Follow up: As needed basis.  He is cleared for all activities.  Patient to call for an MRI if he should have ongoing popping catching symptoms.  Differential diagnosis would be cyclops lesion versus meniscus tear    I agree with history, physical and imaging as well as the assessment and plan as detailed by Dr. Vargas.

## 2025-01-22 RX ORDER — DICLOFENAC SODIUM 75 MG/1
75 TABLET, DELAYED RELEASE ORAL 2 TIMES DAILY
Qty: 60 TABLET | Refills: 0 | Status: SHIPPED | OUTPATIENT
Start: 2025-01-22

## 2025-01-31 ENCOUNTER — THERAPY VISIT (OUTPATIENT)
Dept: PHYSICAL THERAPY | Facility: CLINIC | Age: 45
End: 2025-01-31
Payer: COMMERCIAL

## 2025-01-31 DIAGNOSIS — M25.562 LEFT KNEE PAIN: Primary | ICD-10-CM

## 2025-01-31 DIAGNOSIS — Z98.890 S/P MEDIAL MENISCUS REPAIR OF LEFT KNEE: ICD-10-CM

## 2025-01-31 DIAGNOSIS — Z87.39 S/P ARTHROSCOPIC RECONSTRUCTION OF ACL OF LEFT KNEE USING QUADRICEPS TENDON AUTOGRAFT: ICD-10-CM

## 2025-01-31 DIAGNOSIS — Z98.890 S/P ARTHROSCOPIC RECONSTRUCTION OF ACL OF LEFT KNEE USING QUADRICEPS TENDON AUTOGRAFT: ICD-10-CM

## 2025-01-31 PROCEDURE — 97140 MANUAL THERAPY 1/> REGIONS: CPT | Mod: GP | Performed by: PHYSICAL THERAPIST

## 2025-01-31 PROCEDURE — 97110 THERAPEUTIC EXERCISES: CPT | Mod: GP | Performed by: PHYSICAL THERAPIST

## 2025-02-06 ENCOUNTER — THERAPY VISIT (OUTPATIENT)
Dept: PHYSICAL THERAPY | Facility: CLINIC | Age: 45
End: 2025-02-06
Payer: COMMERCIAL

## 2025-02-06 DIAGNOSIS — Z98.890 S/P MEDIAL MENISCUS REPAIR OF LEFT KNEE: ICD-10-CM

## 2025-02-06 DIAGNOSIS — M25.562 ACUTE PAIN OF LEFT KNEE: Primary | ICD-10-CM

## 2025-02-06 DIAGNOSIS — Z87.39 S/P ARTHROSCOPIC RECONSTRUCTION OF ACL OF LEFT KNEE USING QUADRICEPS TENDON AUTOGRAFT: ICD-10-CM

## 2025-02-06 DIAGNOSIS — Z98.890 S/P ARTHROSCOPIC RECONSTRUCTION OF ACL OF LEFT KNEE USING QUADRICEPS TENDON AUTOGRAFT: ICD-10-CM

## 2025-02-13 ENCOUNTER — THERAPY VISIT (OUTPATIENT)
Dept: PHYSICAL THERAPY | Facility: CLINIC | Age: 45
End: 2025-02-13
Payer: COMMERCIAL

## 2025-02-13 DIAGNOSIS — Z98.890 S/P MEDIAL MENISCUS REPAIR OF LEFT KNEE: ICD-10-CM

## 2025-02-13 DIAGNOSIS — M25.562 ACUTE PAIN OF LEFT KNEE: Primary | ICD-10-CM

## 2025-02-13 DIAGNOSIS — Z98.890 S/P ARTHROSCOPIC RECONSTRUCTION OF ACL OF LEFT KNEE USING QUADRICEPS TENDON AUTOGRAFT: ICD-10-CM

## 2025-02-13 DIAGNOSIS — Z87.39 S/P ARTHROSCOPIC RECONSTRUCTION OF ACL OF LEFT KNEE USING QUADRICEPS TENDON AUTOGRAFT: ICD-10-CM

## 2025-02-27 DIAGNOSIS — Z98.890 S/P ACL RECONSTRUCTION: ICD-10-CM

## 2025-02-27 RX ORDER — DICLOFENAC SODIUM 75 MG/1
75 TABLET, DELAYED RELEASE ORAL 2 TIMES DAILY
Qty: 60 TABLET | Refills: 0 | Status: SHIPPED | OUTPATIENT
Start: 2025-02-27

## 2025-03-06 ENCOUNTER — THERAPY VISIT (OUTPATIENT)
Dept: PHYSICAL THERAPY | Facility: CLINIC | Age: 45
End: 2025-03-06
Payer: COMMERCIAL

## 2025-03-06 DIAGNOSIS — M25.562 ACUTE PAIN OF LEFT KNEE: Primary | ICD-10-CM

## 2025-03-06 DIAGNOSIS — Z98.890 S/P ARTHROSCOPIC RECONSTRUCTION OF ACL OF LEFT KNEE USING QUADRICEPS TENDON AUTOGRAFT: ICD-10-CM

## 2025-03-06 DIAGNOSIS — Z98.890 S/P MEDIAL MENISCUS REPAIR OF LEFT KNEE: ICD-10-CM

## 2025-03-06 DIAGNOSIS — Z87.39 S/P ARTHROSCOPIC RECONSTRUCTION OF ACL OF LEFT KNEE USING QUADRICEPS TENDON AUTOGRAFT: ICD-10-CM

## 2025-03-20 ENCOUNTER — THERAPY VISIT (OUTPATIENT)
Age: 45
End: 2025-03-20
Payer: COMMERCIAL

## 2025-03-20 DIAGNOSIS — Z98.890 S/P MEDIAL MENISCUS REPAIR OF LEFT KNEE: ICD-10-CM

## 2025-03-20 DIAGNOSIS — Z98.890 S/P ARTHROSCOPIC RECONSTRUCTION OF ACL OF LEFT KNEE USING QUADRICEPS TENDON AUTOGRAFT: ICD-10-CM

## 2025-03-20 DIAGNOSIS — M25.562 ACUTE PAIN OF LEFT KNEE: Primary | ICD-10-CM

## 2025-03-20 DIAGNOSIS — Z87.39 S/P ARTHROSCOPIC RECONSTRUCTION OF ACL OF LEFT KNEE USING QUADRICEPS TENDON AUTOGRAFT: ICD-10-CM

## 2025-03-31 DIAGNOSIS — Z98.890 S/P ACL RECONSTRUCTION: ICD-10-CM

## 2025-03-31 RX ORDER — DICLOFENAC SODIUM 75 MG/1
75 TABLET, DELAYED RELEASE ORAL 2 TIMES DAILY
Qty: 60 TABLET | Refills: 0 | Status: SHIPPED | OUTPATIENT
Start: 2025-03-31

## 2025-04-06 ENCOUNTER — HEALTH MAINTENANCE LETTER (OUTPATIENT)
Age: 45
End: 2025-04-06

## 2025-05-19 ENCOUNTER — THERAPY VISIT (OUTPATIENT)
Age: 45
End: 2025-05-19
Payer: COMMERCIAL

## 2025-05-19 DIAGNOSIS — M25.562 ACUTE PAIN OF LEFT KNEE: Primary | ICD-10-CM

## 2025-05-19 DIAGNOSIS — Z98.890 S/P ARTHROSCOPIC RECONSTRUCTION OF ACL OF LEFT KNEE USING QUADRICEPS TENDON AUTOGRAFT: ICD-10-CM

## 2025-05-19 DIAGNOSIS — Z87.39 S/P ARTHROSCOPIC RECONSTRUCTION OF ACL OF LEFT KNEE USING QUADRICEPS TENDON AUTOGRAFT: ICD-10-CM

## 2025-05-19 DIAGNOSIS — Z98.890 S/P MEDIAL MENISCUS REPAIR OF LEFT KNEE: ICD-10-CM

## 2025-05-19 PROCEDURE — 97140 MANUAL THERAPY 1/> REGIONS: CPT | Mod: GP | Performed by: PHYSICAL THERAPIST

## 2025-05-19 PROCEDURE — 97139 UNLISTED THERAPEUTIC PX: CPT | Mod: GP | Performed by: PHYSICAL THERAPIST

## (undated) DEVICE — ESU GROUND PAD ADULT W/CORD E7507

## (undated) DEVICE — GLOVE BIOGEL PI MICRO INDICATOR UNDERGLOVE SZ 8.0 48980

## (undated) DEVICE — TUBING LAP SUCT/IRRIG STRYKER 250070500

## (undated) DEVICE — LINEN TOWEL PACK X5 5464

## (undated) DEVICE — GLOVE BIOGEL PI MICRO INDICATOR UNDERGLOVE SZ 6.5 48965

## (undated) DEVICE — GLOVE BIOGEL PI MICRO SZ 8.0 48580

## (undated) DEVICE — ENDO POUCH UNIVERSAL RETRIEVAL SYSTEM INZII 5MM CD003

## (undated) DEVICE — DRSG STERI STRIP 1/2X4" R1547

## (undated) DEVICE — INSTRUMENT PORTAL SKID AR-4505

## (undated) DEVICE — SUCTION MANIFOLD NEPTUNE 2 SYS 1 PORT 702-025-000

## (undated) DEVICE — SU VICRYL 1 CT-2 27" J335H

## (undated) DEVICE — DRAIN RESERVOIR 100ML JP 0070740

## (undated) DEVICE — SU VICRYL 2-0 CT-2 27" UND J269H

## (undated) DEVICE — SOL WATER IRRIG 1000ML BOTTLE 2F7114

## (undated) DEVICE — PIN GUIDE ARTHREX 2.4MM DRILL  AR-1250L

## (undated) DEVICE — SOL NACL 0.9% IRRIG 3000ML BAG 2B7477

## (undated) DEVICE — BUR ARTHREX COOLCUT SABRE 4.0MMX13CM AR-8400SR

## (undated) DEVICE — DRAPE TIBURON TOP SHEET 100X60" 29352

## (undated) DEVICE — GLOVE BIOGEL PI MICRO SZ 6.0 48560

## (undated) DEVICE — ENDO SHEARS RENEW LAP ENDOCUT SCISSOR TIP 16.5MM 3142

## (undated) DEVICE — SYSTEM LAPAROVUE VISIBILITY LAPVUE10

## (undated) DEVICE — PREP CHLORAPREP 26ML TINTED HI-LITE ORANGE 930815

## (undated) DEVICE — PACK ARTHROSCOPY CUSTOM ASC

## (undated) DEVICE — Device

## (undated) DEVICE — ENDO TROCAR FIRST ENTRY KII FIOS Z-THRD 05X100MM CTF03

## (undated) DEVICE — TUBING SYSTEM ARTHREX PATIENT REDEUCE AR-6421

## (undated) DEVICE — PLATE GROUNDING ADULT W/CORD 9165L

## (undated) DEVICE — PEN MARKING SKIN W/PAPER RULER 31145785

## (undated) DEVICE — SU MONOCRYL 3-0 PS-1 27" Y936H

## (undated) DEVICE — SU MONOCRYL+ 4-0 18IN PS2 UND MCP496G

## (undated) DEVICE — SOL RINGERS LACTATED 1000ML BAG 2B2324X

## (undated) DEVICE — STPL ENDO ARTICULATING 45MM EC45A

## (undated) DEVICE — KIT DEFOGGING-FOG INHIBITOR FOG1001

## (undated) DEVICE — CUSTOM PACK LAP CHOLE SBA5BLCHEA

## (undated) DEVICE — DRAIN BLAKE 15FR SIL 2229

## (undated) DEVICE — ENDO TROCAR SLEEVE KII Z-THREADED 05X100MM CTS02

## (undated) DEVICE — BASIN EMESIS STERILE  SSK9005A

## (undated) DEVICE — STPL RELOAD REG TISSUE ECHELON 45 X 3.6MM BLUE GST45B

## (undated) DEVICE — CATH TRAY FOLEY SURESTEP 16FR DRAIN BAG STATOCK A899916

## (undated) DEVICE — GLOVE BIOGEL PI ORTHOPRO SZ 7.5 47675

## (undated) DEVICE — LINEN ORTHO PACK 5446

## (undated) DEVICE — SUTURE ENDO SURGITIE 21 POLYSORB EL23LN

## (undated) DEVICE — DRESSING BANDAID SURFING CAT ABN4075D

## (undated) DEVICE — TUBING SMOKE EVAC PNEUMOCLEAR HIGH FLOW 0620050250

## (undated) DEVICE — PAD ARMBOARD FOAM EGGCRATE COVIDEN 3114367

## (undated) DEVICE — ABLATOR ARTHREX APOLLO RF MP90 ASPIRATING 90DEG AR-9811

## (undated) DEVICE — PREP DURAPREP 26ML APL 8630

## (undated) DEVICE — DECANTER VIAL 2006S

## (undated) DEVICE — ESU PENCIL SMOKE EVAC W/ROCKER SWITCH 0703-047-000

## (undated) DEVICE — DRAPE MAYO STAND 23X54 8337

## (undated) DEVICE — SUCTION MANIFOLD NEPTUNE 2 SYS 4 PORT 0702-020-000

## (undated) DEVICE — SU ETHILON 3-0 PS-1 18" 1663H

## (undated) DEVICE — PACK ACL SUPPLEMENT CUSTOM ASC

## (undated) DEVICE — SU SILK 2-0 SH 30" K833H

## (undated) RX ORDER — TRIAMCINOLONE ACETONIDE 40 MG/ML
INJECTION, SUSPENSION INTRA-ARTICULAR; INTRAMUSCULAR
Status: DISPENSED
Start: 2024-04-25

## (undated) RX ORDER — FENTANYL CITRATE 50 UG/ML
INJECTION, SOLUTION INTRAMUSCULAR; INTRAVENOUS
Status: DISPENSED
Start: 2022-06-12

## (undated) RX ORDER — PROPOFOL 10 MG/ML
INJECTION, EMULSION INTRAVENOUS
Status: DISPENSED
Start: 2024-07-26

## (undated) RX ORDER — GLYCOPYRROLATE 0.2 MG/ML
INJECTION INTRAMUSCULAR; INTRAVENOUS
Status: DISPENSED
Start: 2024-07-26

## (undated) RX ORDER — BUPIVACAINE HYDROCHLORIDE 2.5 MG/ML
INJECTION, SOLUTION EPIDURAL; INFILTRATION; INTRACAUDAL
Status: DISPENSED
Start: 2022-06-12

## (undated) RX ORDER — HYDROMORPHONE HYDROCHLORIDE 1 MG/ML
INJECTION, SOLUTION INTRAMUSCULAR; INTRAVENOUS; SUBCUTANEOUS
Status: DISPENSED
Start: 2024-07-26

## (undated) RX ORDER — EPINEPHRINE 1 MG/ML
INJECTION, SOLUTION INTRAMUSCULAR; SUBCUTANEOUS
Status: DISPENSED
Start: 2024-07-26

## (undated) RX ORDER — BUPIVACAINE HYDROCHLORIDE 2.5 MG/ML
INJECTION, SOLUTION EPIDURAL; INFILTRATION; INTRACAUDAL
Status: DISPENSED
Start: 2024-07-26

## (undated) RX ORDER — CEFAZOLIN SODIUM 2 G/50ML
SOLUTION INTRAVENOUS
Status: DISPENSED
Start: 2024-07-26

## (undated) RX ORDER — ACETAMINOPHEN 325 MG/1
TABLET ORAL
Status: DISPENSED
Start: 2024-07-26

## (undated) RX ORDER — FENTANYL CITRATE 50 UG/ML
INJECTION, SOLUTION INTRAMUSCULAR; INTRAVENOUS
Status: DISPENSED
Start: 2024-07-26

## (undated) RX ORDER — KETOROLAC TROMETHAMINE 30 MG/ML
INJECTION, SOLUTION INTRAMUSCULAR; INTRAVENOUS
Status: DISPENSED
Start: 2024-07-26

## (undated) RX ORDER — ONDANSETRON 2 MG/ML
INJECTION INTRAMUSCULAR; INTRAVENOUS
Status: DISPENSED
Start: 2024-07-26

## (undated) RX ORDER — LIDOCAINE HYDROCHLORIDE 10 MG/ML
INJECTION, SOLUTION EPIDURAL; INFILTRATION; INTRACAUDAL; PERINEURAL
Status: DISPENSED
Start: 2022-06-12

## (undated) RX ORDER — OXYCODONE HYDROCHLORIDE 5 MG/1
TABLET ORAL
Status: DISPENSED
Start: 2024-07-26

## (undated) RX ORDER — LIDOCAINE HYDROCHLORIDE 10 MG/ML
INJECTION, SOLUTION EPIDURAL; INFILTRATION; INTRACAUDAL; PERINEURAL
Status: DISPENSED
Start: 2024-04-25

## (undated) RX ORDER — ONDANSETRON 2 MG/ML
INJECTION INTRAMUSCULAR; INTRAVENOUS
Status: DISPENSED
Start: 2022-06-12

## (undated) RX ORDER — PROPOFOL 10 MG/ML
INJECTION, EMULSION INTRAVENOUS
Status: DISPENSED
Start: 2022-06-12

## (undated) RX ORDER — DEXAMETHASONE SODIUM PHOSPHATE 4 MG/ML
INJECTION, SOLUTION INTRA-ARTICULAR; INTRALESIONAL; INTRAMUSCULAR; INTRAVENOUS; SOFT TISSUE
Status: DISPENSED
Start: 2024-07-26

## (undated) RX ORDER — DEXAMETHASONE SODIUM PHOSPHATE 10 MG/ML
INJECTION INTRAMUSCULAR; INTRAVENOUS
Status: DISPENSED
Start: 2022-06-12

## (undated) RX ORDER — VANCOMYCIN HYDROCHLORIDE 1 G/20ML
INJECTION, POWDER, LYOPHILIZED, FOR SOLUTION INTRAVENOUS
Status: DISPENSED
Start: 2024-07-26